# Patient Record
Sex: MALE | Race: ASIAN | NOT HISPANIC OR LATINO | URBAN - METROPOLITAN AREA
[De-identification: names, ages, dates, MRNs, and addresses within clinical notes are randomized per-mention and may not be internally consistent; named-entity substitution may affect disease eponyms.]

---

## 2017-11-18 ENCOUNTER — EMERGENCY (EMERGENCY)
Facility: HOSPITAL | Age: 62
LOS: 1 days | Discharge: HOME | End: 2017-11-18
Attending: EMERGENCY MEDICINE | Admitting: EMERGENCY MEDICINE
Payer: COMMERCIAL

## 2017-11-18 VITALS
OXYGEN SATURATION: 99 % | HEART RATE: 62 BPM | RESPIRATION RATE: 18 BRPM | SYSTOLIC BLOOD PRESSURE: 143 MMHG | TEMPERATURE: 98 F | DIASTOLIC BLOOD PRESSURE: 85 MMHG

## 2017-11-18 DIAGNOSIS — R55 SYNCOPE AND COLLAPSE: ICD-10-CM

## 2017-11-18 PROBLEM — Z00.00 ENCOUNTER FOR PREVENTIVE HEALTH EXAMINATION: Status: ACTIVE | Noted: 2017-11-18

## 2017-11-18 LAB
ALBUMIN SERPL ELPH-MCNC: 4.7 G/DL — SIGNIFICANT CHANGE UP (ref 3.3–5)
ALP SERPL-CCNC: 63 U/L — SIGNIFICANT CHANGE UP (ref 40–120)
ALT FLD-CCNC: 21 U/L — SIGNIFICANT CHANGE UP (ref 4–41)
APTT BLD: 31.1 SEC — SIGNIFICANT CHANGE UP (ref 27.5–37.4)
AST SERPL-CCNC: 26 U/L — SIGNIFICANT CHANGE UP (ref 4–40)
BASOPHILS # BLD AUTO: 0.05 K/UL — SIGNIFICANT CHANGE UP (ref 0–0.2)
BASOPHILS NFR BLD AUTO: 0.6 % — SIGNIFICANT CHANGE UP (ref 0–2)
BILIRUB SERPL-MCNC: 1.6 MG/DL — HIGH (ref 0.2–1.2)
BUN SERPL-MCNC: 13 MG/DL — SIGNIFICANT CHANGE UP (ref 7–23)
CALCIUM SERPL-MCNC: 9.1 MG/DL — SIGNIFICANT CHANGE UP (ref 8.4–10.5)
CHLORIDE SERPL-SCNC: 102 MMOL/L — SIGNIFICANT CHANGE UP (ref 98–107)
CK MB BLD-MCNC: 1.4 — SIGNIFICANT CHANGE UP (ref 0–2.5)
CK MB BLD-MCNC: 1.6 — SIGNIFICANT CHANGE UP (ref 0–2.5)
CK MB BLD-MCNC: 2.24 NG/ML — SIGNIFICANT CHANGE UP (ref 1–6.6)
CK MB BLD-MCNC: 2.49 NG/ML — SIGNIFICANT CHANGE UP (ref 1–6.6)
CK SERPL-CCNC: 153 U/L — SIGNIFICANT CHANGE UP (ref 30–200)
CK SERPL-CCNC: 155 U/L — SIGNIFICANT CHANGE UP (ref 30–200)
CO2 SERPL-SCNC: 29 MMOL/L — SIGNIFICANT CHANGE UP (ref 22–31)
CREAT SERPL-MCNC: 1.24 MG/DL — SIGNIFICANT CHANGE UP (ref 0.5–1.3)
EOSINOPHIL # BLD AUTO: 0.31 K/UL — SIGNIFICANT CHANGE UP (ref 0–0.5)
EOSINOPHIL NFR BLD AUTO: 3.7 % — SIGNIFICANT CHANGE UP (ref 0–6)
GLUCOSE SERPL-MCNC: 117 MG/DL — HIGH (ref 70–99)
HBA1C BLD-MCNC: 6.1 % — HIGH (ref 4–5.6)
HCT VFR BLD CALC: 44 % — SIGNIFICANT CHANGE UP (ref 39–50)
HGB BLD-MCNC: 14.4 G/DL — SIGNIFICANT CHANGE UP (ref 13–17)
IMM GRANULOCYTES # BLD AUTO: 0.03 # — SIGNIFICANT CHANGE UP
IMM GRANULOCYTES NFR BLD AUTO: 0.4 % — SIGNIFICANT CHANGE UP (ref 0–1.5)
INR BLD: 1.01 — SIGNIFICANT CHANGE UP (ref 0.88–1.17)
LYMPHOCYTES # BLD AUTO: 1.81 K/UL — SIGNIFICANT CHANGE UP (ref 1–3.3)
LYMPHOCYTES # BLD AUTO: 21.4 % — SIGNIFICANT CHANGE UP (ref 13–44)
MCHC RBC-ENTMCNC: 27.6 PG — SIGNIFICANT CHANGE UP (ref 27–34)
MCHC RBC-ENTMCNC: 32.7 % — SIGNIFICANT CHANGE UP (ref 32–36)
MCV RBC AUTO: 84.5 FL — SIGNIFICANT CHANGE UP (ref 80–100)
MONOCYTES # BLD AUTO: 0.45 K/UL — SIGNIFICANT CHANGE UP (ref 0–0.9)
MONOCYTES NFR BLD AUTO: 5.3 % — SIGNIFICANT CHANGE UP (ref 2–14)
NEUTROPHILS # BLD AUTO: 5.81 K/UL — SIGNIFICANT CHANGE UP (ref 1.8–7.4)
NEUTROPHILS NFR BLD AUTO: 68.6 % — SIGNIFICANT CHANGE UP (ref 43–77)
NRBC # FLD: 0 — SIGNIFICANT CHANGE UP
PLATELET # BLD AUTO: 200 K/UL — SIGNIFICANT CHANGE UP (ref 150–400)
PMV BLD: 11 FL — SIGNIFICANT CHANGE UP (ref 7–13)
POTASSIUM SERPL-MCNC: 4 MMOL/L — SIGNIFICANT CHANGE UP (ref 3.5–5.3)
POTASSIUM SERPL-SCNC: 4 MMOL/L — SIGNIFICANT CHANGE UP (ref 3.5–5.3)
PROT SERPL-MCNC: 7.3 G/DL — SIGNIFICANT CHANGE UP (ref 6–8.3)
PROTHROM AB SERPL-ACNC: 11.3 SEC — SIGNIFICANT CHANGE UP (ref 9.8–13.1)
RBC # BLD: 5.21 M/UL — SIGNIFICANT CHANGE UP (ref 4.2–5.8)
RBC # FLD: 12.6 % — SIGNIFICANT CHANGE UP (ref 10.3–14.5)
SODIUM SERPL-SCNC: 143 MMOL/L — SIGNIFICANT CHANGE UP (ref 135–145)
TROPONIN T SERPL-MCNC: < 0.06 NG/ML — SIGNIFICANT CHANGE UP (ref 0–0.06)
TROPONIN T SERPL-MCNC: < 0.06 NG/ML — SIGNIFICANT CHANGE UP (ref 0–0.06)
WBC # BLD: 8.46 K/UL — SIGNIFICANT CHANGE UP (ref 3.8–10.5)
WBC # FLD AUTO: 8.46 K/UL — SIGNIFICANT CHANGE UP (ref 3.8–10.5)

## 2017-11-18 PROCEDURE — 71020: CPT | Mod: 26

## 2017-11-18 PROCEDURE — 99220: CPT

## 2017-11-18 RX ORDER — SIMVASTATIN 20 MG/1
20 TABLET, FILM COATED ORAL AT BEDTIME
Qty: 0 | Refills: 0 | Status: DISCONTINUED | OUTPATIENT
Start: 2017-11-18 | End: 2017-11-22

## 2017-11-18 RX ORDER — FAMOTIDINE 10 MG/ML
20 INJECTION INTRAVENOUS ONCE
Qty: 0 | Refills: 0 | Status: COMPLETED | OUTPATIENT
Start: 2017-11-18 | End: 2017-11-18

## 2017-11-18 RX ORDER — LOSARTAN POTASSIUM 100 MG/1
25 TABLET, FILM COATED ORAL DAILY
Qty: 0 | Refills: 0 | Status: DISCONTINUED | OUTPATIENT
Start: 2017-11-18 | End: 2017-11-22

## 2017-11-18 RX ADMIN — SIMVASTATIN 20 MILLIGRAM(S): 20 TABLET, FILM COATED ORAL at 21:02

## 2017-11-18 RX ADMIN — Medication 30 MILLILITER(S): at 19:11

## 2017-11-18 RX ADMIN — LOSARTAN POTASSIUM 25 MILLIGRAM(S): 100 TABLET, FILM COATED ORAL at 21:02

## 2017-11-18 NOTE — CONSULT NOTE ADULT - ATTENDING COMMENTS
Echo reviewed. Normal LV function. Unlikely to have significant ventricular dysrhythmia in this setting.

## 2017-11-18 NOTE — CONSULT NOTE ADULT - SUBJECTIVE AND OBJECTIVE BOX
Date of Admission: 2017    CHIEF COMPLAINT: fainting    HISTORY OF PRESENT ILLNESS: 62M with HLD and HTN presented to his PMD today for routine check up. EKG performed as part of exam and patient was told it was abnormal and MD stated machine needed to be fixed and then repeated EKG with some improvement and then told patient he might have "fibrillation" at which point patient proceeded to faint. He woke up quickly, fully oriented, denied CP, SOB and states this will happen when he becomes panicked or severely anxious. In ER EKG concerning for torsades VT and cardiology was consulted.      Allergies    No Known Allergies    Intolerances    	    MEDICATIONS:          aluminum hydroxide/magnesium hydroxide/simethicone Suspension 30 milliLiter(s) Oral once          PAST MEDICAL & SURGICAL HISTORY:  HTN (hypertension)  H. pylori infection: 9 years ago  GERD (gastroesophageal reflux disease)  Spondylolysis  HLD (hyperlipidemia)  No significant past surgical history      FAMILY HISTORY:  Family history of cerebrovascular accident (CVA) (Sibling)  Family history of acute myocardial infarction (Father, Sibling)      SOCIAL HISTORY:    Smoker    Alcohol  Drugs      REVIEW OF SYSTEMS:  See HPI. Otherwise, 10 point ROS done and otherwise negative.    PHYSICAL EXAM:  T(C): 36.6 (17 @ 12:30), Max: 36.6 (17 @ 12:30)  HR: 65 (17 @ 12:56) (62 - 65)  BP: 146/69 (17 @ 12:56) (143/85 - 146/69)  RR: 16 (17 @ 12:56) (16 - 18)  SpO2: 99% (17 @ 12:56) (99% - 99%)  Wt(kg): --  I&O's Summary      Appearance: Normal	  HEENT:   Normal oral mucosa, PERRL, EOMI	  Cardiovascular: Normal S1 S2, No JVD, No murmurs, No edema  Respiratory: Lungs clear to auscultation	  Psychiatry: A & O x 3, Mood & affect appropriate  Gastrointestinal:  Soft, Non-tender, + BS	  Skin: No rashes, No ecchymoses, No cyanosis	  Neurologic: Non-focal  Extremities: Normal range of motion, No clubbing, cyanosis or edema  Vascular: Peripheral pulses palpable 2+ bilaterally        LABS:	 	                        14.4   8.46  )-----------( 200      ( 2017 12:54 )             44.0           143  |  102  |  13  ----------------------------<  117<H>  4.0   |  29  |  1.24    Ca    9.1      2017 12:54    TPro  7.3  /  Alb  4.7  /  TBili  1.6<H>  /  DBili  x   /  AST  26  /  ALT  21  /  AlkPhos  63        CARDIAC MARKERS:  Troponin T, Serum: < 0.06 ng/mL (17 @ 12:54)      CKMB: 2.49 ng/mL ( @ 12:54)    CKMB Relative Index: 1.6 ( @ 12:54)          TELEMETRY: NSR/ sinus bradycardia  	    EC & 2: artifact 3. sinus bradycardia 4. NSR with NSSTW changes

## 2017-11-18 NOTE — ED ADULT NURSE NOTE - OBJECTIVE STATEMENT
Gallito RN: Patient received to room 23, Aox4 and ambulatory.  was at PCP for routine tests, was told her ECG was abnormal, and then passed out. Denies CP, SOB, N/V or dizziness. States has some mild abd indigestion. IV 20g from EMS in left ac, labs drawn and sent. VSS, HR = 65 NSR on cardiac monitor. States he feels well now. Appears comfortable, NAD.

## 2017-11-18 NOTE — ED CDU PROVIDER INITIAL DAY NOTE - OBJECTIVE STATEMENT
63yo M went to PMD for annual checkup with no sx. As part of his checkup had EKG which he gets yearly. Pt. was told initial EKG abnl and "machine not working," MD called and tried to fix machine. Pt. was told this improved the reading but that he might have "fibrillation." Prior to this pt. had no sx but after being told that MD was concerned about heart pt. states became anxious and felt "cold all over." Then pt. passed out, witnessed, x 1 min., awoke with no confusion, told BP was low at that time and had low HR. EKG from that time showed HR 37, NSR and sent to ED. Initial VS in ED with HR 60s and no sx. At time of exam HR 80s, no sx.    CDU PA: Agree with above, patient with ?arrythmia in PCP office and syncopal episode.  In ED has been in NSR, evaluated by EP who saw EKGs from PCP office and stated likely artifact. However given syncope, patient sent to CDU for tele monitoring, cardiac enzymes and echo in AM. Currently patient feeling, well, asymptomatic. Had not had any recent cardiac work up.

## 2017-11-18 NOTE — ED ADULT NURSE NOTE - CHIEF COMPLAINT QUOTE
pt at [facility] office for physical, while EKG was being preformed pt had episode of A-fib converting to sinus perez then patient passed out. pt currently at [facility]. copies of EKG from MDs office are in chart.

## 2017-11-18 NOTE — ED PROVIDER NOTE - PROGRESS NOTE DETAILS
Pt. NAD, EKGs from office reviewed, likely artifact with precordial leads showing regular QRS beats within poor baseline and then NSR perez EKG done during syncope. Will monitor in CDU, EP to see.

## 2017-11-18 NOTE — CONSULT NOTE ADULT - PROBLEM SELECTOR RECOMMENDATION 9
D/W Dr. Martinez, cardio fellow likely vasovagal episode in setting of extreme anxiety. EKG review shows artifact no evidence of torsade VT. Would monitor in CDU, check ECHO.

## 2017-11-18 NOTE — ED PROVIDER NOTE - PMH
GERD (gastroesophageal reflux disease)    H. pylori infection  9 years ago  HLD (hyperlipidemia)    HTN (hypertension)    Spondylolysis

## 2017-11-18 NOTE — ED PROVIDER NOTE - OBJECTIVE STATEMENT
61yo M went to PMD for annual checkup with no sx. As part of his checkup had EKG which he gets yearly. Pt. was told initial EKG abnl and "machine not working," MD called and tried to fix machine. Pt. was told this improved the reading but that he might have "fibrillation." Prior to this pt. had no sx but after being told that MD was concerned about heart pt. states became anxious and felt "cold all over." Then pt. passed out, witnessed, x 1 min., awoke with no confusion, told BP was low at that time and had low HR. EKG from that time showed HR 37, NSR and sent to ED. Initial VS in ED with HR 60s and no sx. At time of exam HR 80s, no sx.

## 2017-11-18 NOTE — ED PROVIDER NOTE - MEDICAL DECISION MAKING DETAILS
63yo M syncope, ? vasovagal with low HR and BP after receiving bad news. Initial EKGs reviewed likely artifact, appearing torsades-like with no sx. Will check labs, rpt EKG, CXR, reassess.

## 2017-11-18 NOTE — ED ADULT TRIAGE NOTE - CHIEF COMPLAINT QUOTE
pt at [facility] office for physical, while EKG was being preformed pt had episode of A-fib converting to sinus perez then patient passed out. pt currently at [facility]. copies of EKG from MDs office are in chart.

## 2017-11-18 NOTE — ED CDU PROVIDER INITIAL DAY NOTE - ATTENDING CONTRIBUTION TO CARE
62M p/w sent by PMD for ?abnormal EKG as well as syncope.  Pt was getting EKG as a part of routine PMD visit when EKG tracing became abnormal, PMD was called in, pt then reportedly became anxious, cold all over, then briefly LOC.  EKG during event SR, BP soft.  By the time pt came to ED Sx gone.  USOH prior to event, feels much better now.  VS:  unremarkable except BP soft     GEN - NAD; well appearing; A+O x3   HEAD - NC/AT     ENT - PEERL, EOMI, mucous membranes  moist , no discharge      NECK: Neck supple, non-tender without lymphadenopathy, no masses, no JVD  PULM - CTA b/l,  symmetric breath sounds  COR -  normal heart sounds    ABD - , ND, NT, soft, no guarding, no rebound, no masses    BACK - no CVA tenderness, nontender spine     EXTREMS - no edema, no deformity, warm and well perfused    SKIN - no rash or bruising      NEUROLOGIC - alert, CN 2-12 intact, sensation nl, motor 5/5 RUE/LUE/RLE/LLE.      IMP:  62M p/w ?arrhythmia on baseline rhythmn strip at PMD office, pt then had syncope with prodrome.  Likely vasovagal.  EKG prior to event concerning for VT/torsades, however pt had been feeling well at this point.  Cards eval - VT likely artifact, following EKG unremarkable.  Will closely tele monitor and serial CE, echo given syncope.  Reassess in AM.

## 2017-11-18 NOTE — ED PROVIDER NOTE - FAMILY HISTORY
Father  Still living? Unknown  Family history of acute myocardial infarction, Age at diagnosis: Age Unknown     Sibling  Still living? Unknown  Family history of acute myocardial infarction, Age at diagnosis: Age Unknown  Family history of cerebrovascular accident (CVA), Age at diagnosis: Age Unknown

## 2017-11-19 VITALS
HEART RATE: 67 BPM | OXYGEN SATURATION: 99 % | DIASTOLIC BLOOD PRESSURE: 52 MMHG | TEMPERATURE: 98 F | SYSTOLIC BLOOD PRESSURE: 116 MMHG | RESPIRATION RATE: 18 BRPM

## 2017-11-19 PROCEDURE — 99217: CPT

## 2017-11-19 PROCEDURE — 93306 TTE W/DOPPLER COMPLETE: CPT | Mod: 26

## 2017-11-19 NOTE — ED CDU PROVIDER SUBSEQUENT DAY NOTE - HISTORY
61 yo M with PMH of HTN, HLD, GERD, presents with syncope while getting EKG done in PMD office associated with ?arrythemia. In ED has been in NSR, evaluated by EP who saw EKGs from PCP office and stated likely artifact. However given syncope, patient sent to CDU for tele monitoring, cardiac enzymes and echo in AM. Denies any cardiac workup in the past.

## 2017-11-19 NOTE — ED CDU PROVIDER SUBSEQUENT DAY NOTE - ATTENDING CONTRIBUTION TO CARE
CDU Attending Dr. Montenegro: 63 yo male with PMH GERD in ED after syncopal episode while having EKG done in PMD's office during routine evaluation.  EKG reportedly with ?arrhythmia felt to be artifact by EP.  Placed in CDU for echo in light of possible arrhythmia with syncopal episode.  No CP/SOB, N/V/D or abdominal pain.  On exam pt well appearing, in NAD, heart RRR, lungs CTAB, abd NTND, extremities without swelling, strength 5/5 in all extremities and skin without rash.

## 2017-11-19 NOTE — ED CDU PROVIDER SUBSEQUENT DAY NOTE - MEDICAL DECISION MAKING DETAILS
63 yo M with PMH of HTN, HLD, GERD, presents with syncope while getting EKG at PMD office today.  Sent to CDU for Echo in the morning and observation.

## 2017-11-19 NOTE — ED CDU PROVIDER DISPOSITION NOTE - CLINICAL COURSE
Pt placed in CDU for echo after syncopal episode while having EKG at PMD's office.  Some questions of whether or not there was arrhythmia at time of pt's syncope, but no arrhythmia during CDU stay.  No CP/SOB, N/V/D or abdominal pain.  CE negative x 2.  Echo WNL and pt stable for discharge with outpatient follow up.

## 2017-11-19 NOTE — ED CDU PROVIDER SUBSEQUENT DAY NOTE - PROGRESS NOTE DETAILS
Patient lying comfortably in bed, NAD, c/o heart burn given Maalox with improvement. CE2 negative. Echo in the morning.  Will continue to monitor. 61 yo male with PMH GERD in ED after syncopal episode while having EKG done in PMD's office during routine evaluation.  EKG reportedly with ?arrhythmia felt to be artifact by EP.  Placed in CDU for echo in light of possible arrhythmia with syncopal episode.  At the time of my evaluation pt noting feeling no symptoms, denies that he ever had CP.  No SOB, N/V/D or abdominal pain.  On exam pt well appearing, in NAD, heart RRR, lungs CTAB, abd NTND, extremities without swelling, strength 5/5 in all extremities and skin without rash.  Awaiting echo results.

## 2017-11-19 NOTE — ED CDU PROVIDER DISPOSITION NOTE - ATTENDING CONTRIBUTION TO CARE
CDU Attending Dr. Montenegro Discharge Note: Pt placed in CDU for echo after syncopal episode while having EKG at PMD's office.  Some questions of whether or not there was arrhythmia at time of pt's syncope, but no arrhythmia during CDU stay.  No CP/SOB, N/V/D or abdominal pain.  On exam pt well appearing, in NAD, heart RRR, lungs CTAB, abd NTND, extremities without swelling, strength 5/5 in all extremities and skin without rash.  CE negative x 2.  Echo WNL and pt stable for discharge with outpatient follow up.

## 2018-06-04 ENCOUNTER — EMERGENCY (EMERGENCY)
Facility: HOSPITAL | Age: 63
LOS: 1 days | Discharge: HOME | End: 2018-06-04
Attending: EMERGENCY MEDICINE
Payer: COMMERCIAL

## 2018-06-04 VITALS
HEART RATE: 89 BPM | SYSTOLIC BLOOD PRESSURE: 145 MMHG | RESPIRATION RATE: 16 BRPM | OXYGEN SATURATION: 99 % | DIASTOLIC BLOOD PRESSURE: 77 MMHG | HEIGHT: 68 IN | TEMPERATURE: 98 F | WEIGHT: 145.06 LBS

## 2018-06-04 VITALS — TEMPERATURE: 98 F | SYSTOLIC BLOOD PRESSURE: 135 MMHG | HEART RATE: 76 BPM | DIASTOLIC BLOOD PRESSURE: 81 MMHG

## 2018-06-04 LAB
ALBUMIN SERPL ELPH-MCNC: 4.1 G/DL — SIGNIFICANT CHANGE UP (ref 3.5–5)
ALP SERPL-CCNC: 72 U/L — SIGNIFICANT CHANGE UP (ref 40–120)
ALT FLD-CCNC: 35 U/L DA — SIGNIFICANT CHANGE UP (ref 10–60)
ANION GAP SERPL CALC-SCNC: 7 MMOL/L — SIGNIFICANT CHANGE UP (ref 5–17)
APTT BLD: 32.6 SEC — SIGNIFICANT CHANGE UP (ref 27.5–37.4)
AST SERPL-CCNC: 32 U/L — SIGNIFICANT CHANGE UP (ref 10–40)
BASOPHILS # BLD AUTO: 0.1 K/UL — SIGNIFICANT CHANGE UP (ref 0–0.2)
BASOPHILS NFR BLD AUTO: 1 % — SIGNIFICANT CHANGE UP (ref 0–2)
BILIRUB SERPL-MCNC: 1.4 MG/DL — HIGH (ref 0.2–1.2)
BUN SERPL-MCNC: 12 MG/DL — SIGNIFICANT CHANGE UP (ref 7–18)
CALCIUM SERPL-MCNC: 8.8 MG/DL — SIGNIFICANT CHANGE UP (ref 8.4–10.5)
CHLORIDE SERPL-SCNC: 104 MMOL/L — SIGNIFICANT CHANGE UP (ref 96–108)
CO2 SERPL-SCNC: 30 MMOL/L — SIGNIFICANT CHANGE UP (ref 22–31)
CREAT SERPL-MCNC: 1.12 MG/DL — SIGNIFICANT CHANGE UP (ref 0.5–1.3)
EOSINOPHIL # BLD AUTO: 0.3 K/UL — SIGNIFICANT CHANGE UP (ref 0–0.5)
EOSINOPHIL NFR BLD AUTO: 4.2 % — SIGNIFICANT CHANGE UP (ref 0–6)
GLUCOSE SERPL-MCNC: 108 MG/DL — HIGH (ref 70–99)
HCT VFR BLD CALC: 44.4 % — SIGNIFICANT CHANGE UP (ref 39–50)
HGB BLD-MCNC: 14.3 G/DL — SIGNIFICANT CHANGE UP (ref 13–17)
INR BLD: 0.98 RATIO — SIGNIFICANT CHANGE UP (ref 0.88–1.16)
LYMPHOCYTES # BLD AUTO: 1.4 K/UL — SIGNIFICANT CHANGE UP (ref 1–3.3)
LYMPHOCYTES # BLD AUTO: 16.8 % — SIGNIFICANT CHANGE UP (ref 13–44)
MCHC RBC-ENTMCNC: 27.8 PG — SIGNIFICANT CHANGE UP (ref 27–34)
MCHC RBC-ENTMCNC: 32.2 GM/DL — SIGNIFICANT CHANGE UP (ref 32–36)
MCV RBC AUTO: 86.6 FL — SIGNIFICANT CHANGE UP (ref 80–100)
MONOCYTES # BLD AUTO: 0.4 K/UL — SIGNIFICANT CHANGE UP (ref 0–0.9)
MONOCYTES NFR BLD AUTO: 4.6 % — SIGNIFICANT CHANGE UP (ref 2–14)
NEUTROPHILS # BLD AUTO: 6 K/UL — SIGNIFICANT CHANGE UP (ref 1.8–7.4)
NEUTROPHILS NFR BLD AUTO: 73.3 % — SIGNIFICANT CHANGE UP (ref 43–77)
PLATELET # BLD AUTO: 184 K/UL — SIGNIFICANT CHANGE UP (ref 150–400)
POTASSIUM SERPL-MCNC: 3.7 MMOL/L — SIGNIFICANT CHANGE UP (ref 3.5–5.3)
POTASSIUM SERPL-SCNC: 3.7 MMOL/L — SIGNIFICANT CHANGE UP (ref 3.5–5.3)
PROT SERPL-MCNC: 7.7 G/DL — SIGNIFICANT CHANGE UP (ref 6–8.3)
PROTHROM AB SERPL-ACNC: 10.7 SEC — SIGNIFICANT CHANGE UP (ref 9.8–12.7)
RBC # BLD: 5.13 M/UL — SIGNIFICANT CHANGE UP (ref 4.2–5.8)
RBC # FLD: 12.4 % — SIGNIFICANT CHANGE UP (ref 10.3–14.5)
SODIUM SERPL-SCNC: 141 MMOL/L — SIGNIFICANT CHANGE UP (ref 135–145)
TROPONIN I SERPL-MCNC: <0.015 NG/ML — SIGNIFICANT CHANGE UP (ref 0–0.04)
WBC # BLD: 8.2 K/UL — SIGNIFICANT CHANGE UP (ref 3.8–10.5)
WBC # FLD AUTO: 8.2 K/UL — SIGNIFICANT CHANGE UP (ref 3.8–10.5)

## 2018-06-04 PROCEDURE — 70450 CT HEAD/BRAIN W/O DYE: CPT | Mod: 26

## 2018-06-04 PROCEDURE — 70450 CT HEAD/BRAIN W/O DYE: CPT

## 2018-06-04 PROCEDURE — 85730 THROMBOPLASTIN TIME PARTIAL: CPT

## 2018-06-04 PROCEDURE — 80053 COMPREHEN METABOLIC PANEL: CPT

## 2018-06-04 PROCEDURE — 85027 COMPLETE CBC AUTOMATED: CPT

## 2018-06-04 PROCEDURE — 84484 ASSAY OF TROPONIN QUANT: CPT

## 2018-06-04 PROCEDURE — 85610 PROTHROMBIN TIME: CPT

## 2018-06-04 PROCEDURE — 99284 EMERGENCY DEPT VISIT MOD MDM: CPT

## 2018-06-04 PROCEDURE — 99284 EMERGENCY DEPT VISIT MOD MDM: CPT | Mod: 25

## 2018-06-04 RX ORDER — SODIUM CHLORIDE 9 MG/ML
1000 INJECTION INTRAMUSCULAR; INTRAVENOUS; SUBCUTANEOUS ONCE
Qty: 0 | Refills: 0 | Status: COMPLETED | OUTPATIENT
Start: 2018-06-04 | End: 2018-06-04

## 2018-06-04 RX ADMIN — SODIUM CHLORIDE 1000 MILLILITER(S): 9 INJECTION INTRAMUSCULAR; INTRAVENOUS; SUBCUTANEOUS at 16:32

## 2018-06-04 NOTE — ED PROVIDER NOTE - PROGRESS NOTE DETAILS
Nurse went to draw blood and pt screamed and is demanding to leave. He says he went to see a nephrologist and was sent to the ED and does not want to be here. AAOx3, risks, benefits, alternatives and hazards reviewed and he will sign ama and follow up with pmd. Precautions reviewed. Pt feels fine observed, tolerating po, no complaints, DW Dr gomez from neuro and will see in office. Pt is not driving. Precautions reviewed vasovagal syncope vs seizure.

## 2018-06-04 NOTE — ED ADULT NURSE NOTE - OBJECTIVE STATEMENT
Pt states that he was at the pmd office and spouse reported pt having "seizures like" gestures, pt was seen being shaking, diaphoretic and Loc for 1-2 mins.Pt denies having seizures. Last time pt has similar symptoms was 4 months ago.

## 2018-06-04 NOTE — ED PROVIDER NOTE - CRANIAL NERVE AND PUPILLARY EXAM
central vision intact/peripheral vision intact/tongue is midline/cranial nerves 2-12 intact/corneal reflex intact/central and peripheral vision intact/extra-ocular movements intact/cough reflex intact

## 2018-06-04 NOTE — ED PROVIDER NOTE - OBJECTIVE STATEMENT
61 y/o male with no significant PMHx presents to the ED c/o syncope x today. Pt notes he was admitted to [facility] x 4 months for observation s/p an episode of syncope, pt was noted to have an abnormal EKG. Pt went to she his eye doctor today, was told he has the beginning of glaucoma. Pt then had a syncopal episode while sitting at his eye doctor's office, witnessed by wife. Pt's R sided body was shaking, pt urinated himself, lasted 2 minutes and repeated itself once again. Pt now in ED without complaints. Pt denies HA, neck pain, abd pain, chest pain, palpitations, or any other complaints. NKDA.

## 2018-06-04 NOTE — ED PROVIDER NOTE - MEDICAL DECISION MAKING DETAILS
Pt with syncopal episodes associated with urinary incontinence and R sided body shaking x today. Will get CT head, send labs and consult neuro.

## 2018-10-16 ENCOUNTER — TRANSCRIPTION ENCOUNTER (OUTPATIENT)
Age: 63
End: 2018-10-16

## 2018-12-11 NOTE — ED ADULT NURSE NOTE - NS ED NURSE IV DC DT
Detail Level: Generalized Include Location In Plan?: No Additional Note: Advised that lesions are benign in nature and require no treatment if asymptomatic. Additional Note: Educated lesions are benign in nature and require no treatment. 04-Jun-2018 19:41

## 2021-09-13 PROBLEM — I10 ESSENTIAL (PRIMARY) HYPERTENSION: Chronic | Status: ACTIVE | Noted: 2017-11-18

## 2021-10-08 ENCOUNTER — APPOINTMENT (OUTPATIENT)
Dept: UROLOGY | Facility: CLINIC | Age: 66
End: 2021-10-08
Payer: COMMERCIAL

## 2021-10-08 VITALS
WEIGHT: 148 LBS | SYSTOLIC BLOOD PRESSURE: 155 MMHG | HEART RATE: 75 BPM | HEIGHT: 67 IN | RESPIRATION RATE: 16 BRPM | BODY MASS INDEX: 23.23 KG/M2 | OXYGEN SATURATION: 97 % | DIASTOLIC BLOOD PRESSURE: 82 MMHG

## 2021-10-08 PROCEDURE — 99204 OFFICE O/P NEW MOD 45 MIN: CPT

## 2021-10-10 NOTE — HISTORY OF PRESENT ILLNESS
[FreeTextEntry1] : 10/08/2021: Mr. Mc is a 66 year old male presenting today for a urological assessment. He is currently experiencing urinary urgency, specifically when he gets up after driving for an extended period of time. He noticed it for the first time about 4-5 months ago. Sometimes he feels moistness in his groin. His family doctor prescribed Finasteride 5 mg, one tablet daily. Denies urge incontinence. There is also terminal dribbling when he finishes urinating. A few years ago after he began taking blood pressure medication, he noticed his penis is bent and he lost about 80% erection function. In the past 2-3 months, his erections have improved and he is happy with the status. The erections are now 75% adequate. He reports feeling a fibrous plaque on the base of the penis. One year ago before beginning Finasteride, his PSA was 3.9 ng/mL. After 2 months of the Finasteride, his PSA was about 0.9 ng/mL and his creatinine was within normal limits. He was also treated at the time with Ciprofloxacin for prostatitis. The family doctor did a rectal exam and reported that the prostate was smooth, normal texture, but was enlarged. The patient has lower left flank pain and discomfort in his left groin. He is managing heartburn and constipation. He has had cataract surgery on both eyes. He takes Losartan potassium 25 mg and Simvastatin. No FHx of prostate cancer or kidney stones. No PMHx of kidney stones. No history of seizures. He has been evaluated and diagnosed with vasovagal syncope.

## 2021-10-10 NOTE — PHYSICAL EXAM
[General Appearance - Well Developed] : well developed [General Appearance - Well Nourished] : well nourished [Normal Appearance] : normal appearance [Well Groomed] : well groomed [General Appearance - In No Acute Distress] : no acute distress [Edema] : no peripheral edema [Respiration, Rhythm And Depth] : normal respiratory rhythm and effort [Exaggerated Use Of Accessory Muscles For Inspiration] : no accessory muscle use [Abdomen Soft] : soft [Abdomen Tenderness] : non-tender [Normal Station and Gait] : the gait and station were normal for the patient's age [] : no rash [No Focal Deficits] : no focal deficits [Oriented To Time, Place, And Person] : oriented to person, place, and time [Affect] : the affect was normal [Mood] : the mood was normal [Not Anxious] : not anxious [Prostate Size ___ gm] : prostate size [unfilled] gm [FreeTextEntry1] : On exam, patient is circumcised. He says that the proximal right corpa cavernosa used to have hard fibrous plaque in it, but it is underneath mons pubis, there is some firmness. Bulk of penile shaft is normal. Urethral meatus is normal. Urethra supple, nontender. No inguinal adenopathy. Bilateral descended and soft testes. Testes no masses. Epididymides nontender no masses. Spermatic cord is nontender with no masses.  Digital rectal exam found no suspicious rectal masses. Anal tone is normal. The prostate is non tender, with normal texture, discrete borders, and no nodules. It is an 10g transurethral resection size. No rectal mucosal lesions. No gross blood on exam finger.

## 2021-10-10 NOTE — ASSESSMENT
[FreeTextEntry1] : 10/08/2021: Mr. Mc is a 66 year old male presenting today for a urological assessment. He is currently experiencing urinary urgency, specifically when he gets up after driving for an extended period of time. He noticed it for the first time about 4-5 months ago. Sometimes he feels moistness in his groin. His family doctor prescribed Finasteride 5 mg, one tablet daily. Denies urge incontinence. There is also terminal dribbling when he finishes urinating. A few years ago after he began taking blood pressure medication, he noticed his penis is bent and he lost about 80% erection function. In the past 2-3 months, his erections have improved and he is happy with the status. The erections are now 75% adequate. He reports feeling a fibrous plaque on the base of the penis. One year ago before beginning Finasteride, his PSA was 3.9 ng/mL. After 2 months of the Finasteride, his PSA was about 0.9 ng/mL and his creatinine was within normal limits. He was also treated at the time with Ciprofloxacin for prostatitis. The family doctor did a rectal exam and reported that the prostate was smooth, normal texture, but was enlarged. The patient has lower left flank pain and discomfort in his left groin. He is managing heartburn and constipation. He has had cataract surgery on both eyes. He takes Losartan potassium 25 mg and Simvastatin. No FHx of prostate cancer or kidney stones. No PMHx of kidney stones. No history of seizures. He has been evaluated and diagnosed with vasovagal syncope. \par \par On exam, patient is circumcised. He says that the proximal right corpa cavernosa used to have hard fibrous plaque in it, but it is underneath mons pubis, there is some firmness. Bulk of penile shaft is normal. Urethral meatus is normal. Urethra supple, nontender. No inguinal adenopathy. Bilateral descended and soft testes. Testes no masses. Epididymides nontender no masses. Spermatic cord is nontender with no masses. \par Digital rectal exam found no suspicious rectal masses. Anal tone is normal. The prostate is non tender, with normal texture, discrete borders, and no nodules. It is an 10g transurethral resection size. No rectal mucosal lesions. No gross blood on exam finger. \par \par The patient has a prescription for Flomax 0.4 mg. I recommend he takes one capsule daily with food. I advised him of the potential side effects of retrograde ejaculation or lightheadedness. \par \par RTO in 2-3 weeks. \par \par Preparation, in-person office visit, and coordination of care took: 45 minutes.

## 2021-10-29 ENCOUNTER — APPOINTMENT (OUTPATIENT)
Dept: UROLOGY | Facility: CLINIC | Age: 66
End: 2021-10-29
Payer: COMMERCIAL

## 2021-10-29 PROCEDURE — 99214 OFFICE O/P EST MOD 30 MIN: CPT

## 2021-10-30 LAB
ANION GAP SERPL CALC-SCNC: 13 MMOL/L
APPEARANCE: CLEAR
BACTERIA: NEGATIVE
BILIRUBIN URINE: NEGATIVE
BLOOD URINE: NEGATIVE
BUN SERPL-MCNC: 13 MG/DL
CA-I SERPL-SCNC: 1.2 MMOL/L
CALCIUM SERPL-MCNC: 9.3 MG/DL
CALCIUM SERPL-MCNC: 9.3 MG/DL
CHLORIDE SERPL-SCNC: 103 MMOL/L
CO2 SERPL-SCNC: 26 MMOL/L
COLOR: NORMAL
CREAT SERPL-MCNC: 1.2 MG/DL
GLUCOSE QUALITATIVE U: NEGATIVE
GLUCOSE SERPL-MCNC: 174 MG/DL
HYALINE CASTS: 0 /LPF
KETONES URINE: NEGATIVE
LEUKOCYTE ESTERASE URINE: NEGATIVE
MICROSCOPIC-UA: NORMAL
NITRITE URINE: NEGATIVE
PARATHYROID HORMONE INTACT: 50 PG/ML
PH URINE: 5.5
PHOSPHATE SERPL-MCNC: 2.2 MG/DL
POTASSIUM SERPL-SCNC: 3.9 MMOL/L
PROTEIN URINE: NEGATIVE
PSA FREE FLD-MCNC: 32 %
PSA FREE SERPL-MCNC: 0.34 NG/ML
PSA SERPL-MCNC: 1.06 NG/ML
RED BLOOD CELLS URINE: 0 /HPF
SODIUM SERPL-SCNC: 142 MMOL/L
SPECIFIC GRAVITY URINE: 1.01
SQUAMOUS EPITHELIAL CELLS: 0 /HPF
UROBILINOGEN URINE: NORMAL
WHITE BLOOD CELLS URINE: 0 /HPF

## 2021-10-31 LAB — BACTERIA UR CULT: NORMAL

## 2021-10-31 NOTE — ASSESSMENT
[FreeTextEntry1] : 10/08/2021: Mr. Mc is a 66 year old male presenting today for a urological assessment. He is currently experiencing urinary urgency, specifically when he gets up after driving for an extended period of time. He noticed it for the first time about 4-5 months ago. Sometimes he feels moistness in his groin. His family doctor prescribed Finasteride 5 mg, one tablet daily. Denies urge incontinence. There is also terminal dribbling when he finishes urinating. A few years ago after he began taking blood pressure medication, he noticed his penis is bent and he lost about 80% erection function. In the past 2-3 months, his erections have improved and he is happy with the status. The erections are now 75% adequate. He reports feeling a fibrous plaque on the base of the penis. One year ago before beginning Finasteride, his PSA was 3.9 ng/mL. After 2 months of the Finasteride, his PSA was about 0.9 ng/mL and his creatinine was within normal limits. He was also treated at the time with Ciprofloxacin for prostatitis. The family doctor did a rectal exam and reported that the prostate was smooth, normal texture, but was enlarged. The patient has lower left flank pain and discomfort in his left groin. He is managing heartburn and constipation. He has had cataract surgery on both eyes. He takes Losartan potassium 25 mg and Simvastatin. No FHx of prostate cancer or kidney stones. No PMHx of kidney stones. No history of seizures. He has been evaluated and diagnosed with vasovagal syncope. \par On exam, patient is circumcised. He says that the proximal right corpa cavernosa used to have hard fibrous plaque in it, but it is underneath mons pubis, there is some firmness. Bulk of penile shaft is normal. Urethral meatus is normal. Urethra supple, nontender. No inguinal adenopathy. Bilateral descended and soft testes. Testes no masses. Epididymides nontender no masses. Spermatic cord is nontender with no masses. \par Digital rectal exam found no suspicious rectal masses. Anal tone is normal. The prostate is non tender, with normal texture, discrete borders, and no nodules. It is an 10g transurethral resection size. No rectal mucosal lesions. No gross blood on exam finger. \par \par 10/29/2021: Patient presents today for follow up. He has continued urinary urgency that has improved about 10% with the Tamsulosin 0.4 mg. He has had no lightheadedness, but notes that his blood pressure dropped to 90/60. Since then, he has stopped taking his Losartan and his blood pressure returned to normal. His PSA recently was 0.9 ng/mL, which he verbalized. His blood work from 10/9/21 showed a TSH was normal and his creatinine was 1.01 and alkaline phos was normal at 73. \par \par Blood work today included BMP, CBC, alkaline phosphatase, PSA, and testosterone. \par \par I advised the patient to discontinue taking the Tamsulosin 0.4 mg. \par I am prescribing Silodosin 8 mg, one capsule daily with dinner. I recommend he continue monitoring his blood pressure. If his pressure rises again, he should begin taking his Losartan again. If he has problems getting the medication through his insurance, he should call my nurse for pre-certification. \par \par The patient will RTO in 2 weeks. \par \par Preparation, in-person office visit, and coordination of care took: 30 minutes

## 2021-10-31 NOTE — HISTORY OF PRESENT ILLNESS
[FreeTextEntry1] : 10/08/2021: Mr. Mc is a 66 year old male presenting today for a urological assessment. He is currently experiencing urinary urgency, specifically when he gets up after driving for an extended period of time. He noticed it for the first time about 4-5 months ago. Sometimes he feels moistness in his groin. His family doctor prescribed Finasteride 5 mg, one tablet daily. Denies urge incontinence. There is also terminal dribbling when he finishes urinating. A few years ago after he began taking blood pressure medication, he noticed his penis is bent and he lost about 80% erection function. In the past 2-3 months, his erections have improved and he is happy with the status. The erections are now 75% adequate. He reports feeling a fibrous plaque on the base of the penis. One year ago before beginning Finasteride, his PSA was 3.9 ng/mL. After 2 months of the Finasteride, his PSA was about 0.9 ng/mL and his creatinine was within normal limits. He was also treated at the time with Ciprofloxacin for prostatitis. The family doctor did a rectal exam and reported that the prostate was smooth, normal texture, but was enlarged. The patient has lower left flank pain and discomfort in his left groin. He is managing heartburn and constipation. He has had cataract surgery on both eyes. He takes Losartan potassium 25 mg and Simvastatin. No FHx of prostate cancer or kidney stones. No PMHx of kidney stones. No history of seizures. He has been evaluated and diagnosed with vasovagal syncope.  \par \par 10/29/2021: Patient presents today for follow up. He has continued urinary urgency that has improved about 10% with the Tamsulosin 0.4 mg. He has had no lightheadedness, but notes that his blood pressure dropped to 90/60. Since then, he has stopped taking his Losartan and his blood pressure returned to normal. His PSA recently was 0.9 ng/mL, which he verbalized. His blood work from 10/9/21 showed a TSH was normal and his creatinine was 1.01 and alkaline phos was normal at 73.

## 2021-11-01 ENCOUNTER — NON-APPOINTMENT (OUTPATIENT)
Age: 66
End: 2021-11-01

## 2021-11-02 LAB
TESTOST BND SERPL-MCNC: 6.9 PG/ML
TESTOSTERONE TOTAL S: 214 NG/DL
URINE CYTOLOGY: NORMAL

## 2021-11-15 ENCOUNTER — APPOINTMENT (OUTPATIENT)
Dept: UROLOGY | Facility: CLINIC | Age: 66
End: 2021-11-15

## 2021-12-30 NOTE — ED ADULT NURSE NOTE - NSSISCREENINGQ5_ED_A_ED
Current Issues/Problems reviewed on call:  DM  HTN  Surgery status  Covid safety    Details for Interventions/Education completed on call:   Reports is feeling good today with minimal discomfort to hip. States is now aware as to why his surgery was postponed and is looking forward to having procedure completed. Recommended attend specialist appointment and confirmed appointment date and time.    Reports is continuing to exercise several times a week and states would like to visit gym because of current weather conditions. Discussed Covid safety protocols- masking, social distancing, handwashing. Verbalized understanding and reports has ad all vaccines including booster. Advised to practice safety precautions if visiting health club and recommended continuing to walk outside if weather permits or performing exercises within in the home as tolerated. Verbalized understanding.    Screening completed for  COVID -19 using the [facility] Symptom . Screening is Negative for symptoms    Time Frame for Follow up:  Within within 2-3 weeks    Plan for Next Call: Monthly fu, provider fu, BS, BP    Care Plan reviewed with Patient  and he agrees with the plan of care and the call follow up plan.         No

## 2022-01-19 ENCOUNTER — APPOINTMENT (OUTPATIENT)
Dept: UROLOGY | Facility: CLINIC | Age: 67
End: 2022-01-19
Payer: COMMERCIAL

## 2022-01-19 PROCEDURE — 99214 OFFICE O/P EST MOD 30 MIN: CPT

## 2022-01-21 NOTE — ED CDU PROVIDER INITIAL DAY NOTE - NSTIMEPROVIDERCAREINITIATE_GEN_ER
You can access the FollowMyHealth Patient Portal offered by [facility] [place] by registering at the following website: [email or web address]. By joining [employer]’s FollowMyHealth portal, you will also be able to view your health information using other applications (apps) compatible with our system.
~1 month ago; normal as per pt
18-Nov-2017 12:47

## 2022-01-22 LAB
ALBUMIN SERPL ELPH-MCNC: 4.5 G/DL
ANION GAP SERPL CALC-SCNC: 15 MMOL/L
APPEARANCE: CLEAR
BACTERIA UR CULT: NORMAL
BACTERIA: NEGATIVE
BILIRUBIN URINE: NEGATIVE
BLOOD URINE: NEGATIVE
CA-I SERPL-SCNC: 1.2 MMOL/L
CALCIUM SERPL-MCNC: 9.1 MG/DL
CHLORIDE SERPL-SCNC: 105 MMOL/L
CO2 SERPL-SCNC: 24 MMOL/L
COLOR: COLORLESS
DHEA-S SERPL-MCNC: 250 UG/DL
ESTIMATED AVERAGE GLUCOSE: 128 MG/DL
ESTRADIOL SERPL-MCNC: 23 PG/ML
ESTROGEN SERPL-MCNC: 129 PG/ML
FSH SERPL-MCNC: 3.3 IU/L
GLUCOSE QUALITATIVE U: NEGATIVE
GLUCOSE SERPL-MCNC: 122 MG/DL
HBA1C MFR BLD HPLC: 6.1 %
HYALINE CASTS: 0 /LPF
KETONES URINE: NEGATIVE
LEUKOCYTE ESTERASE URINE: NEGATIVE
LH SERPL-ACNC: 4.2 IU/L
MICROSCOPIC-UA: NORMAL
NITRITE URINE: NEGATIVE
PH URINE: 7
PHOSPHATE SERPL-MCNC: 3 MG/DL
POTASSIUM SERPL-SCNC: 4.1 MMOL/L
PROGEST SERPL-MCNC: 0.2 NG/ML
PROLACTIN SERPL-MCNC: 8.9 NG/ML
PROT SERPL-MCNC: 6.9 G/DL
PROTEIN URINE: NEGATIVE
RED BLOOD CELLS URINE: 0 /HPF
SHBG SERPL-SCNC: 23.5 NMOL/L
SODIUM SERPL-SCNC: 144 MMOL/L
SPECIFIC GRAVITY URINE: 1.01
SQUAMOUS EPITHELIAL CELLS: 0 /HPF
TESTOST FREE SERPL-MCNC: 8.4 PG/ML
TESTOST SERPL-MCNC: 262 NG/DL
URINE CYTOLOGY: NORMAL
UROBILINOGEN URINE: NORMAL
WHITE BLOOD CELLS URINE: 0 /HPF

## 2022-01-22 NOTE — ADDENDUM
[FreeTextEntry1] : I, Noris Ospinain, acted solely as a scribe for Dr. Chente Harper on this date 01/19/2022.\par \par All medical record entries made by the Scribe were at my, Dr. Chente Harper, direction and personally dictated by me on 01/19/2022. I have reviewed the chart and agree that the record accurately reflects my personal performance of the history, physical exam, assessment and plan.  I have also personally directed, reviewed and agreed with the chart.

## 2022-01-22 NOTE — HISTORY OF PRESENT ILLNESS
[FreeTextEntry1] : 10/08/2021: Mr. Mc is a 66 year old male presenting today for a urological assessment. He is currently experiencing urinary urgency, specifically when he gets up after driving for an extended period of time. He noticed it for the first time about 4-5 months ago. Sometimes he feels moistness in his groin. His family doctor prescribed Finasteride 5 mg, one tablet daily. Denies urge incontinence. There is also terminal dribbling when he finishes urinating. A few years ago after he began taking blood pressure medication, he noticed his penis is bent and he lost about 80% erection function. In the past 2-3 months, his erections have improved and he is happy with the status. The erections are now 75% adequate. He reports feeling a fibrous plaque on the base of the penis. One year ago before beginning Finasteride, his PSA was 3.9 ng/mL. After 2 months of the Finasteride, his PSA was about 0.9 ng/mL and his creatinine was within normal limits. He was also treated at the time with Ciprofloxacin for prostatitis. The family doctor did a rectal exam and reported that the prostate was smooth, normal texture, but was enlarged. The patient has lower left flank pain and discomfort in his left groin. He is managing heartburn and constipation. He has had cataract surgery on both eyes. He takes Losartan potassium 25 mg and Simvastatin. No FHx of prostate cancer or kidney stones. No PMHx of kidney stones. No history of seizures. He has been evaluated and diagnosed with vasovagal syncope.  \par \par 10/29/2021: Patient presents today for follow up. He has continued urinary urgency that has improved about 10% with the Tamsulosin 0.4 mg. He has had no lightheadedness, but notes that his blood pressure dropped to 90/60. Since then, he has stopped taking his Losartan and his blood pressure returned to normal. His PSA recently was 0.9 ng/mL, which he verbalized. His blood work from 10/9/21 showed a TSH was normal and his creatinine was 1.01 and alkaline phos was normal at 73. \par \par 01/19/2022: Patient presents today for follow up. Currently on silodosin 8mg once daily and urinary symptoms are significantly improved. Urinary urgency is 90% improved. Leaks very few drops when he has urgency. States he has intermittent constipation. States that he stopped his Losartan due to BP being too low, and now BP is normal and he feels okay. Did notify his PCP of this. Erections are better than before and now satisfactory. Notes he does not have ejaculate which he is not concerned about. No hx of kidney stones. Additionally states he has some left lumbar pain that does not change with position but states he feels it in his muscle.

## 2022-01-22 NOTE — ASSESSMENT
[FreeTextEntry1] : 10/08/2021: Mr. Mc is a 66 year old male presenting today for a urological assessment. He is currently experiencing urinary urgency, specifically when he gets up after driving for an extended period of time. He noticed it for the first time about 4-5 months ago. Sometimes he feels moistness in his groin. His family doctor prescribed Finasteride 5 mg, one tablet daily. Denies urge incontinence. There is also terminal dribbling when he finishes urinating. A few years ago after he began taking blood pressure medication, he noticed his penis is bent and he lost about 80% erection function. In the past 2-3 months, his erections have improved and he is happy with the status. The erections are now 75% adequate. He reports feeling a fibrous plaque on the base of the penis. One year ago before beginning Finasteride, his PSA was 3.9 ng/mL. After 2 months of the Finasteride, his PSA was about 0.9 ng/mL and his creatinine was within normal limits. He was also treated at the time with Ciprofloxacin for prostatitis. The family doctor did a rectal exam and reported that the prostate was smooth, normal texture, but was enlarged. The patient has lower left flank pain and discomfort in his left groin. He is managing heartburn and constipation. He has had cataract surgery on both eyes. He takes Losartan potassium 25 mg and Simvastatin. No FHx of prostate cancer or kidney stones. No PMHx of kidney stones. No history of seizures. He has been evaluated and diagnosed with vasovagal syncope. \par \par 10/29/2021: Patient presents today for follow up. He has continued urinary urgency that has improved about 10% with the Tamsulosin 0.4 mg. He has had no lightheadedness, but notes that his blood pressure dropped to 90/60. Since then, he has stopped taking his Losartan and his blood pressure returned to normal. His PSA recently was 0.9 ng/mL, which he verbalized. His blood work from 10/9/21 showed a TSH was normal and his creatinine was 1.01 and alkaline phos was normal at 73. \par \par 01/19/2022: Patient presents today for follow up. Currently on silodosin 8mg once daily and urinary symptoms are significantly improved. Urinary urgency is 90% improved. Leaks very few drops when he has urgency. States he has intermittent constipation. States that he stopped his Losartan due to BP being too low, and now BP is normal and he feels okay. Did notify his PCP of this. Erections are better than before and now satisfactory. Notes he does not have ejaculate which he is not concerned about. No hx of kidney stones. Additionally states he has some left lumbar pain that does not change with position but states he feels it in his muscle. \par \par Reviewed 10/29/21 lab results with patient. \par \par I prescribed the patient oxybutynin ER 5mg once daily for urinary urgency. I informed the patient of dry mouth and constipation as a side effect. \par \par Continue silodosin 8mg once daily. \par \par I explained that lack of ejaculate is due to silodosin and is not concerning. \par \par Recommend Colace (docusate) two capsules twice daily to be taken with a large glass of water each time for constipation. \par \par Recommend heat, exercises, and CBD oil for left lumbar pain. I discussed a renal US to evaluate the pain but if patient believes it is muscular and more superficial pain, then it is not necessary. \par \par Blood work today includes albumin, calcium ionized, calcium total, electrolyte panel, phosphorus, protein, HbA1c, glucose RC client gray top, androstenedione, dehydroepiandrosterone, dehydroepiandrosterone-sulfate, dihydrotestosterone, estradiol, estrogen, FSH, LH, progesterone, prolactin, SHBG, PSA, and testosterone.  \par \par The patient produced a urine sample which will be sent for urinalysis, urine cytology, and urine culture. \par \par Patient will schedule a telehealth video visit in 2 weeks for reassessment of oxybutynin ER 5mg. \par \par Preparation, in-person office visit, and coordination of care took: 30 minutes

## 2022-01-25 LAB — ANDROST SERPL-MCNC: 50 NG/DL

## 2022-01-29 LAB
ANDROSTANOLONE SERPL-MCNC: 34 NG/DL
DHEA SERPL-MCNC: 150 NG/DL

## 2022-03-31 ENCOUNTER — APPOINTMENT (OUTPATIENT)
Dept: UROLOGY | Facility: CLINIC | Age: 67
End: 2022-03-31
Payer: COMMERCIAL

## 2022-03-31 VITALS
TEMPERATURE: 97.8 F | WEIGHT: 150 LBS | OXYGEN SATURATION: 99 % | SYSTOLIC BLOOD PRESSURE: 137 MMHG | HEIGHT: 67 IN | HEART RATE: 72 BPM | BODY MASS INDEX: 23.54 KG/M2 | DIASTOLIC BLOOD PRESSURE: 77 MMHG

## 2022-03-31 DIAGNOSIS — K21.00 GASTRO-ESOPHAGEAL REFLUX DISEASE WITH ESOPHAGITIS, WITHOUT BLEEDING: ICD-10-CM

## 2022-03-31 PROCEDURE — 99214 OFFICE O/P EST MOD 30 MIN: CPT

## 2022-04-03 PROBLEM — K21.00 GASTROESOPHAGEAL REFLUX DISEASE WITH ESOPHAGITIS: Status: ACTIVE | Noted: 2022-04-03

## 2022-04-03 LAB
APPEARANCE: CLEAR
BACTERIA UR CULT: NORMAL
BACTERIA: NEGATIVE
BILIRUBIN URINE: NEGATIVE
BLOOD URINE: NEGATIVE
COLOR: NORMAL
GLUCOSE QUALITATIVE U: NEGATIVE
HYALINE CASTS: 0 /LPF
KETONES URINE: NEGATIVE
LEUKOCYTE ESTERASE URINE: NEGATIVE
MICROSCOPIC-UA: NORMAL
NITRITE URINE: NEGATIVE
PH URINE: 7
PROTEIN URINE: NEGATIVE
PSA FREE FLD-MCNC: 33 %
PSA FREE SERPL-MCNC: 0.34 NG/ML
PSA SERPL-MCNC: 1.03 NG/ML
RED BLOOD CELLS URINE: 0 /HPF
SPECIFIC GRAVITY URINE: 1.01
SQUAMOUS EPITHELIAL CELLS: 0 /HPF
URINE CYTOLOGY: NORMAL
UROBILINOGEN URINE: NORMAL
WHITE BLOOD CELLS URINE: 0 /HPF

## 2022-04-03 NOTE — ASSESSMENT
[FreeTextEntry1] : 10/08/2021: Mr. Mc is a 66 year old male presenting today for a urological assessment. He is currently experiencing urinary urgency, specifically when he gets up after driving for an extended period of time. He noticed it for the first time about 4-5 months ago. Sometimes he feels moistness in his groin. His family doctor prescribed Finasteride 5 mg, one tablet daily. Denies urge incontinence. There is also terminal dribbling when he finishes urinating. A few years ago after he began taking blood pressure medication, he noticed his penis is bent and he lost about 80% erection function. In the past 2-3 months, his erections have improved and he is happy with the status. The erections are now 75% adequate. He reports feeling a fibrous plaque on the base of the penis. One year ago before beginning Finasteride, his PSA was 3.9 ng/mL. After 2 months of the Finasteride, his PSA was about 0.9 ng/mL and his creatinine was within normal limits. He was also treated at the time with Ciprofloxacin for prostatitis. The family doctor did a rectal exam and reported that the prostate was smooth, normal texture, but was enlarged. The patient has lower left flank pain and discomfort in his left groin. He is managing heartburn and constipation. He has had cataract surgery on both eyes. He takes Losartan potassium 25 mg and Simvastatin. No FHx of prostate cancer or kidney stones. No PMHx of kidney stones. No history of seizures. He has been evaluated and diagnosed with vasovagal syncope. \par \par 10/29/2021: Patient presents today for follow up. He has continued urinary urgency that has improved about 10% with the Tamsulosin 0.4 mg. He has had no lightheadedness, but notes that his blood pressure dropped to 90/60. Since then, he has stopped taking his Losartan and his blood pressure returned to normal. His PSA recently was 0.9 ng/mL, which he verbalized. His blood work from 10/9/21 showed a TSH was normal and his creatinine was 1.01 and alkaline phos was normal at 73. \par \par 01/19/2022: Patient presents today for follow up. Currently on silodosin 8mg once daily and urinary symptoms are significantly improved. Urinary urgency is 90% improved. Leaks very few drops when he has urgency. States he has intermittent constipation. States that he stopped his Losartan due to BP being too low, and now BP is normal and he feels okay. Did notify his PCP of this. Erections are better than before and now satisfactory. Notes he does not have ejaculate which he is not concerned about. No hx of kidney stones. Additionally states he has some left lumbar pain that does not change with position but states he feels it in his muscle. \par \par 03/31/2022: Patient presents today for a follow up visit. He is currently taking Oxybutynin and Silodosin. He is experiencing dry mouth, rated at about 5/10. His symptoms are mostly the same. He notes he feels moistness in his groin while driving, but the area is not wet. He reports when he started the Silodosin, his BP stabilized. Since beginning the Silodosin, he also notes acid reflux. It worsened after beginning the Oxybutynin. He had been given Pantoprazole in the past for the acid reflux without relief. He also uses Tums. He is concerned that the acid reflux is caused by H. pylori, which he had in the past when he moved to the [place]. He agrees to follow with his GI regarding H. pylori. His BP today was 137/77. He notes concern of a progesterone of 0.2, which was mildly elevated. Pt is prediabetic. \par \par I recommend he discontinue the Oxybutynin to see if his acid reflux improves without the medication. He should continue following with his gastroenterologist for the heart burn. He should continue taking the Silodosin. I discussed using Myrbetriq in the future if he cannot increase the dose of Oxybutynin due to the side effects. I advised him to not be worried about the mildly elevated progesterone as it was not a problem. I am referring the patient to Dr. Lele Connors or Dr. Evangelist Painter\par \par RTO in one month. \par \par Preparation, in-person office visit, and coordination of care took: 30 minutes

## 2022-04-03 NOTE — ADDENDUM
[FreeTextEntry1] : I, Maryam Dorado, acted solely as a scribe for Dr. Chente Harper on this date 03/31/2022.\par \par All medical record entries made by the Scribe were at my, Dr. Chente Harper, direction and personally dictated by me on 03/31/2022. I have reviewed the chart and agree that the record accurately reflects my personal performance of the history, physical exam, assessment and plan. I have also personally directed, reviewed and agreed with the chart.

## 2022-04-04 ENCOUNTER — TRANSCRIPTION ENCOUNTER (OUTPATIENT)
Age: 67
End: 2022-04-04

## 2022-04-05 NOTE — ED CDU PROVIDER INITIAL DAY NOTE - NEUROLOGICAL, MLM
Refill request     Medication pended if agreeable    Last Appt:  5/4/2020  Next Appt:   Visit date not found  Med verified in Epic Alert and oriented, no focal deficits, no motor or sensory deficits.

## 2022-04-06 LAB
TESTOST FREE SERPL-MCNC: 7.7 PG/ML
TESTOST SERPL-MCNC: 233 NG/DL

## 2022-07-19 ENCOUNTER — APPOINTMENT (OUTPATIENT)
Dept: UROLOGY | Facility: CLINIC | Age: 67
End: 2022-07-19

## 2022-07-19 VITALS
OXYGEN SATURATION: 99 % | TEMPERATURE: 97.6 F | SYSTOLIC BLOOD PRESSURE: 129 MMHG | HEART RATE: 66 BPM | RESPIRATION RATE: 17 BRPM | DIASTOLIC BLOOD PRESSURE: 71 MMHG

## 2022-07-19 PROCEDURE — 99215 OFFICE O/P EST HI 40 MIN: CPT

## 2022-07-19 RX ORDER — OXYBUTYNIN CHLORIDE 5 MG/1
5 TABLET, EXTENDED RELEASE ORAL DAILY
Qty: 30 | Refills: 11 | Status: COMPLETED | COMMUNITY
Start: 2022-01-19 | End: 2022-07-19

## 2022-07-21 ENCOUNTER — NON-APPOINTMENT (OUTPATIENT)
Age: 67
End: 2022-07-21

## 2022-07-22 LAB
APPEARANCE: CLEAR
BACTERIA UR CULT: NORMAL
BACTERIA: NEGATIVE
BILIRUBIN URINE: NEGATIVE
BLOOD URINE: NEGATIVE
COLOR: NORMAL
GLUCOSE QUALITATIVE U: NEGATIVE
HYALINE CASTS: 0 /LPF
KETONES URINE: NEGATIVE
LEUKOCYTE ESTERASE URINE: NEGATIVE
MICROSCOPIC-UA: NORMAL
NITRITE URINE: NEGATIVE
PH URINE: 7
PROTEIN URINE: NEGATIVE
RED BLOOD CELLS URINE: 1 /HPF
SPECIFIC GRAVITY URINE: 1.02
SQUAMOUS EPITHELIAL CELLS: 0 /HPF
URINE CYTOLOGY: NORMAL
UROBILINOGEN URINE: NORMAL
WHITE BLOOD CELLS URINE: 0 /HPF

## 2022-07-25 NOTE — REVIEW OF SYSTEMS
[Negative] : Heme/Lymph [Heartburn] : heartburn [Initiating Urination Req. Strain] : initiating urination requires straining [Erectile Dysfunction] : no erectile dysfunction [Libido Decreased] : libido not decreased

## 2022-07-25 NOTE — ASSESSMENT
[FreeTextEntry1] : 10/08/2021: Mr. Mc is a 67 year old male presenting today for a urological assessment. He is currently experiencing urinary urgency, specifically when he gets up after driving for an extended period of time. He noticed it for the first time about 4-5 months ago. Sometimes he feels moistness in his groin. His family doctor prescribed Finasteride 5 mg, one tablet daily. Denies urge incontinence. There is also terminal dribbling when he finishes urinating. A few years ago after he began taking blood pressure medication, he noticed his penis is bent and he lost about 80% erection function. In the past 2-3 months, his erections have improved and he is happy with the status. The erections are now 75% adequate. He reports feeling a fibrous plaque on the base of the penis. One year ago before beginning Finasteride, his PSA was 3.9 ng/mL. After 2 months of the Finasteride, his PSA was about 0.9 ng/mL and his creatinine was within normal limits. He was also treated at the time with Ciprofloxacin for prostatitis. The family doctor did a rectal exam and reported that the prostate was smooth, normal texture, but was enlarged. The patient has lower left flank pain and discomfort in his left groin. He is managing heartburn and constipation. He has had cataract surgery on both eyes. He takes Losartan potassium 25 mg and Simvastatin. No FHx of prostate cancer or kidney stones. No PMHx of kidney stones. No history of seizures. He has been evaluated and diagnosed with vasovagal syncope. \par \par 10/29/2021: Patient presents today for follow up. He has continued urinary urgency that has improved about 10% with the Tamsulosin 0.4 mg. He has had no lightheadedness, but notes that his blood pressure dropped to 90/60. Since then, he has stopped taking his Losartan and his blood pressure returned to normal. His PSA recently was 0.9 ng/mL, which he verbalized. His blood work from 10/9/21 showed a TSH was normal and his creatinine was 1.01 and alkaline phos was normal at 73. \par \par 01/19/2022: Patient presents today for follow up. Currently on silodosin 8mg once daily and urinary symptoms are significantly improved. Urinary urgency is 90% improved. Leaks very few drops when he has urgency. States he has intermittent constipation. States that he stopped his Losartan due to BP being too low, and now BP is normal and he feels okay. Did notify his PCP of this. Erections are better than before and now satisfactory. Notes he does not have ejaculate which he is not concerned about. No hx of kidney stones. Additionally states he has some left lumbar pain that does not change with position but states he feels it in his muscle. \par \par 03/31/2022: Patient presents today for a follow up visit. He is currently taking Oxybutynin and Silodosin. He is experiencing dry mouth, rated at about 5/10. His symptoms are mostly the same. He notes he feels moistness in his groin while driving, but the area is not wet. He reports when he started the Silodosin, his BP stabilized. Since beginning the Silodosin, he also notes acid reflux. It worsened after beginning the Oxybutynin. He had been given Pantoprazole in the past for the acid reflux without relief. He also uses Tums. He is concerned that the acid reflux is caused by H. pylori, which he had in the past when he moved to the [place]. He agrees to follow with his GI regarding H. pylori. His BP today was 137/77. He notes concern of a progesterone of 0.2, which was mildly elevated. Pt is prediabetic. \par \par I recommend he discontinue the Oxybutynin to see if his acid reflux improves without the medication. He should continue following with his gastroenterologist for the heart burn. He should continue taking the Silodosin. I discussed using Myrbetriq in the future if he cannot increase the dose of Oxybutynin due to the side effects. I advised him to not be worried about the mildly elevated progesterone as it was not a problem. I am referring the patient to Dr. Lele Connors or Dr. Evangelist Painter\par RTO in one month. \par \par 07/19/2022:  presents today for a follow up. He states that he is seeing a gastroenterologist in [facility] who has him scheduled for an endoscopy this week. He has been given Nexium and famotidine for his heartburn, which has brought him significant relief. In regards to the patient's urination, he feels it is about the same, maybe slightly improved. He still has some urinary urgency and minor urge incontinence. When he was on oxybutynin, he experienced heartburn and found it to not be that beneficial to his urination. He continues to take silodosin 8 mg once daily with food. He is able to hold his urine for about 2 hours, however if he drinks a lot of water he can hold it for about an hour. He drinks about 3L of water a day minimum. When he stands to urinate, he admits to pushing, but if he sits he has a pretty easy flow. He reports that sexual function is fairly good, however due to silodosin, he has decreased semen volume. Sexual desire remains good. He has found that reaching climax takes more time. The patient denies FHx and PMHx of kidney stones. Denies FHx of prostate cancer. Denies ever smoking. 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 \par \par The patient produced a urine sample which will be sent for urinalysis, urine cytology, and urine culture. \par \par The knee-chest position was utilized for the PHIL. Digital rectal exam found no suspicious rectal masses. Normal seminal vesicles. Anal tone is normal. The prostate is non tender, with normal texture, discrete borders, and no nodules. It is a 25 gram transurethral resection size. No rectal mucosal lesions. No gross blood on the examining finger. \par \par Reviewed and discussed 3/31/2022 laboratory work results in detail. PSA was nice and low at 1.03. \par \par I requested that the patient have his endoscopy report sent to my office for me to review after it is done. He also stated he had some abdominal imaging done at [facility], which I also requested be sent to my office. \par \par I prescribed the patient myrbetriq 25 mg, one tablet daily. I informed the patient of the risk of increasing blood pressure and urinary retention. Pt will monitor his BP at home. I reviewed his past BP's today. He will continue taking silodosin 8 mg once daily, prescription renewed today. If Myrbetriq works well for him, we can try switching silodosin to alfuzosin in an effort to preserving semen volume. \par \par The patient inquired if there were any procedures for the prostate that would be beneficial to him. We discussed options including a TURP, green light laser, holmium laser enucleation, and a urolift. Rationale, benefits, risks, and alternatives reviewed. I explained that it is my recommendation he continues with medication management and pt was agreeable but was curious to know his options. \par  \par Patient will RTO in about 1 month for reassessment on Myrbetriq. \par \par Preparation, in person office visit, and coordination of care: 40 minutes.

## 2022-07-25 NOTE — PHYSICAL EXAM
[General Appearance - Well Developed] : well developed [General Appearance - Well Nourished] : well nourished [Normal Appearance] : normal appearance [Well Groomed] : well groomed [General Appearance - In No Acute Distress] : no acute distress [Abdomen Soft] : soft [Abdomen Tenderness] : non-tender [Edema] : no peripheral edema [] : no respiratory distress [Respiration, Rhythm And Depth] : normal respiratory rhythm and effort [Exaggerated Use Of Accessory Muscles For Inspiration] : no accessory muscle use [Oriented To Time, Place, And Person] : oriented to person, place, and time [Affect] : the affect was normal [Mood] : the mood was normal [Not Anxious] : not anxious [Normal Station and Gait] : the gait and station were normal for the patient's age [No Focal Deficits] : no focal deficits [FreeTextEntry1] : \par The knee-chest position was utilized for the PHIL. Digital rectal exam found no suspicious rectal masses. Normal seminal vesicles. Anal tone is normal. The prostate is non tender, with normal texture, discrete borders, and no nodules. It is a 25 gram transurethral resection size. No rectal mucosal lesions. No gross blood on the examining finger.

## 2022-07-25 NOTE — HISTORY OF PRESENT ILLNESS
[FreeTextEntry1] : 10/08/2021: Mr. Mc is a 67 year old male presenting today for a urological assessment. He is currently experiencing urinary urgency, specifically when he gets up after driving for an extended period of time. He noticed it for the first time about 4-5 months ago. Sometimes he feels moistness in his groin. His family doctor prescribed Finasteride 5 mg, one tablet daily. Denies urge incontinence. There is also terminal dribbling when he finishes urinating. A few years ago after he began taking blood pressure medication, he noticed his penis is bent and he lost about 80% erection function. In the past 2-3 months, his erections have improved and he is happy with the status. The erections are now 75% adequate. He reports feeling a fibrous plaque on the base of the penis. One year ago before beginning Finasteride, his PSA was 3.9 ng/mL. After 2 months of the Finasteride, his PSA was about 0.9 ng/mL and his creatinine was within normal limits. He was also treated at the time with Ciprofloxacin for prostatitis. The family doctor did a rectal exam and reported that the prostate was smooth, normal texture, but was enlarged. The patient has lower left flank pain and discomfort in his left groin. He is managing heartburn and constipation. He has had cataract surgery on both eyes. He takes Losartan potassium 25 mg and Simvastatin. No FHx of prostate cancer or kidney stones. No PMHx of kidney stones. No history of seizures. He has been evaluated and diagnosed with vasovagal syncope.  \par \par 10/29/2021: Patient presents today for follow up. He has continued urinary urgency that has improved about 10% with the Tamsulosin 0.4 mg. He has had no lightheadedness, but notes that his blood pressure dropped to 90/60. Since then, he has stopped taking his Losartan and his blood pressure returned to normal. His PSA recently was 0.9 ng/mL, which he verbalized. His blood work from 10/9/21 showed a TSH was normal and his creatinine was 1.01 and alkaline phos was normal at 73. \par \par 01/19/2022: Patient presents today for follow up. Currently on silodosin 8mg once daily and urinary symptoms are significantly improved. Urinary urgency is 90% improved. Leaks very few drops when he has urgency. States he has intermittent constipation. States that he stopped his Losartan due to BP being too low, and now BP is normal and he feels okay. Did notify his PCP of this. Erections are better than before and now satisfactory. Notes he does not have ejaculate which he is not concerned about. No hx of kidney stones. Additionally states he has some left lumbar pain that does not change with position but states he feels it in his muscle. \par \par 07/19/2022:  presents today for a follow up. He states that he is seeing a gastroenterologist in [facility] who has him scheduled for an endoscopy this week. He has been given Nexium and famotidine for his heartburn, which has brought him significant relief. In regards to the patient's urination, he feels it is about the same, maybe slightly improved. He still has some urinary urgency and minor urge incontinence. When he was on oxybutynin, he experienced heartburn and found it to not be that beneficial to his urination. He continues to take silodosin 8 mg once daily with food. He is able to hold his urine for about 2 hours, however if he drinks a lot of water he can hold it for about an hour. He drinks about 3L of water a day minimum. When he stands to urinate, he admits to pushing, but if he sits he has a pretty easy flow. He reports that sexual function is fairly good, however due to silodosin, he has decreased semen volume. Sexual desire is good. He has found that reaching climax takes more time. The patient denies FHx and PMHx of kidney stones. Denies FHx of prostate cancer. Denies ever smoking. 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

## 2022-07-25 NOTE — ADDENDUM
[FreeTextEntry1] : This note was authored by Nicole Frankel working as a scribe for Dr.Gary Harper. I, Dr. Chente Harper have reviewed the content of this note and confirm it is true and accurate. I personally performed the history and physical examination and made all the decisions 07/19/2022.

## 2022-08-15 NOTE — ED PROVIDER NOTE - TEMPLATE, MLM
Head,  normocephalic,  atraumatic,  Face,  Face within normal limits,  Ears,  External ears within normal limits,  Nose/Nasopharynx,  External nose  normal appearance,  nares patent,  no nasal discharge,  Mouth and Throat,  Oral cavity appearance normal,  Breath odor normal,  Lips,  Appearance normal , Head,  normocephalic,  atraumatic,  Face,  Face within normal limits,  Ears,  External ears within normal limits,  Nose/Nasopharynx,  External nose  normal appearance,  nares patent,  no nasal discharge,  Mouth and Throat,  Oral cavity appearance normal,  Breath odor normal,  Lips,  Appearance normal
Cardiac

## 2023-06-06 ENCOUNTER — APPOINTMENT (OUTPATIENT)
Dept: UROLOGY | Facility: CLINIC | Age: 68
End: 2023-06-06

## 2023-06-15 ENCOUNTER — APPOINTMENT (OUTPATIENT)
Dept: UROLOGY | Facility: CLINIC | Age: 68
End: 2023-06-15
Payer: MEDICARE

## 2023-06-15 VITALS
TEMPERATURE: 97.8 F | HEART RATE: 76 BPM | HEIGHT: 67 IN | DIASTOLIC BLOOD PRESSURE: 74 MMHG | SYSTOLIC BLOOD PRESSURE: 131 MMHG | RESPIRATION RATE: 16 BRPM | BODY MASS INDEX: 23.54 KG/M2 | WEIGHT: 150 LBS | OXYGEN SATURATION: 97 %

## 2023-06-15 PROCEDURE — 99214 OFFICE O/P EST MOD 30 MIN: CPT

## 2023-06-18 NOTE — REVIEW OF SYSTEMS
[Heartburn] : heartburn [Initiating Urination Req. Strain] : initiating urination requires straining [Negative] : Heme/Lymph [Erectile Dysfunction] : no erectile dysfunction [Libido Decreased] : libido not decreased

## 2023-06-18 NOTE — PHYSICAL EXAM
[General Appearance - Well Developed] : well developed [General Appearance - Well Nourished] : well nourished [Normal Appearance] : normal appearance [Well Groomed] : well groomed [General Appearance - In No Acute Distress] : no acute distress [Abdomen Soft] : soft [Abdomen Tenderness] : non-tender [Edema] : no peripheral edema [] : no respiratory distress [Respiration, Rhythm And Depth] : normal respiratory rhythm and effort [Exaggerated Use Of Accessory Muscles For Inspiration] : no accessory muscle use [Oriented To Time, Place, And Person] : oriented to person, place, and time [Affect] : the affect was normal [Mood] : the mood was normal [Not Anxious] : not anxious [Normal Station and Gait] : the gait and station were normal for the patient's age [No Focal Deficits] : no focal deficits [FreeTextEntry1] : The knee-chest position was utilized for the PHIL. Digital rectal exam found no suspicious rectal masses. Normal seminal vesicles. Anal tone is normal. The prostate is non tender, with normal texture, discrete borders, and no nodules. It is a 25 gram transurethral resection size. No rectal mucosal lesions. No gross blood on the examining finger.

## 2023-06-18 NOTE — ADDENDUM
[FreeTextEntry1] : This note was authored by Nicole Frankel working as a scribe for Dr.Gary Harper. I, Dr. Chente Harper have reviewed the content of this note and confirm it is true and accurate. I personally performed the history and physical examination and made all the decisions 06/15/2023

## 2023-06-18 NOTE — HISTORY OF PRESENT ILLNESS
[FreeTextEntry1] : 10/08/2021: Mr. Mc is a 67 year old male presenting today for a urological assessment. He is currently experiencing urinary urgency, specifically when he gets up after driving for an extended period of time. He noticed it for the first time about 4-5 months ago. Sometimes he feels moistness in his groin. His family doctor prescribed Finasteride 5 mg, one tablet daily. Denies urge incontinence. There is also terminal dribbling when he finishes urinating. A few years ago after he began taking blood pressure medication, he noticed his penis is bent and he lost about 80% erection function. In the past 2-3 months, his erections have improved and he is happy with the status. The erections are now 75% adequate. He reports feeling a fibrous plaque on the base of the penis. One year ago before beginning Finasteride, his PSA was 3.9 ng/mL. After 2 months of the Finasteride, his PSA was about 0.9 ng/mL and his creatinine was within normal limits. He was also treated at the time with Ciprofloxacin for prostatitis. The family doctor did a rectal exam and reported that the prostate was smooth, normal texture, but was enlarged. The patient has lower left flank pain and discomfort in his left groin. He is managing heartburn and constipation. He has had cataract surgery on both eyes. He takes Losartan potassium 25 mg and Simvastatin. No FHx of prostate cancer or kidney stones. No PMHx of kidney stones. No history of seizures. He has been evaluated and diagnosed with vasovagal syncope.  \par \par 10/29/2021: Patient presents today for follow up. He has continued urinary urgency that has improved about 10% with the Tamsulosin 0.4 mg. He has had no lightheadedness, but notes that his blood pressure dropped to 90/60. Since then, he has stopped taking his Losartan and his blood pressure returned to normal. His PSA recently was 0.9 ng/mL, which he verbalized. His blood work from 10/9/21 showed a TSH was normal and his creatinine was 1.01 and alkaline phos was normal at 73. \par \par 01/19/2022: Patient presents today for follow up. Currently on silodosin 8mg once daily and urinary symptoms are significantly improved. Urinary urgency is 90% improved. Leaks very few drops when he has urgency. States he has intermittent constipation. States that he stopped his Losartan due to BP being too low, and now BP is normal and he feels okay. Did notify his PCP of this. Erections are better than before and now satisfactory. Notes he does not have ejaculate which he is not concerned about. No hx of kidney stones. Additionally states he has some left lumbar pain that does not change with position but states he feels it in his muscle. \par \par 07/19/2022:  presents today for a follow up. He states that he is seeing a gastroenterologist in [facility] who has him scheduled for an endoscopy this week. He has been given Nexium and famotidine for his heartburn, which has brought him significant relief. In regards to the patient's urination, he feels it is about the same, maybe slightly improved. He still has some urinary urgency and minor urge incontinence. When he was on oxybutynin, he experienced heartburn and found it to not be that beneficial to his urination. He continues to take silodosin 8 mg once daily with food. He is able to hold his urine for about 2 hours, however if he drinks a lot of water he can hold it for about an hour. He drinks about 3L of water a day minimum. When he stands to urinate, he admits to pushing, but if he sits he has a pretty easy flow. He reports that sexual function is fairly good, however due to silodosin, he has decreased semen volume. Sexual desire is good. He has found that reaching climax takes more time. The patient denies FHx and PMHx of kidney stones. Denies FHx of prostate cancer. Denies ever smoking. 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 \par \par 06/15/2023: Mr. DAYDAY MC presents today for a follow up. He is accompanied by his wife. He reports that for a while he went without insurance due to his company shutting down. He was without his medications for quite a while and did not notice a significant decline in urination. He feels his urination is fairly good. Denies nocturia. Occasional urinary urgency but denies urge incontinence. When he is close to the bathroom the pressure increases. Sexual desire is fair. Erections are improved since coming off his medications. Pt walks a lot for exercise.

## 2023-06-24 LAB
APPEARANCE: CLEAR
BACTERIA UR CULT: NORMAL
BACTERIA: NEGATIVE /HPF
BILIRUBIN URINE: NEGATIVE
BLOOD URINE: NEGATIVE
CAST: 0 /LPF
COLOR: YELLOW
EPITHELIAL CELLS: 0 /HPF
GLUCOSE QUALITATIVE U: NEGATIVE MG/DL
KETONES URINE: ABNORMAL MG/DL
LEUKOCYTE ESTERASE URINE: NEGATIVE
MICROSCOPIC-UA: NORMAL
NITRITE URINE: NEGATIVE
PH URINE: 6
PROTEIN URINE: NEGATIVE MG/DL
RED BLOOD CELLS URINE: 1 /HPF
SPECIFIC GRAVITY URINE: 1.02
URINE CYTOLOGY: NORMAL
UROBILINOGEN URINE: 1 MG/DL
WHITE BLOOD CELLS URINE: 0 /HPF

## 2023-07-06 ENCOUNTER — APPOINTMENT (OUTPATIENT)
Dept: UROLOGY | Facility: CLINIC | Age: 68
End: 2023-07-06

## 2024-06-03 ENCOUNTER — NON-APPOINTMENT (OUTPATIENT)
Age: 69
End: 2024-06-03

## 2024-06-03 ENCOUNTER — APPOINTMENT (OUTPATIENT)
Dept: CARDIOLOGY | Facility: CLINIC | Age: 69
End: 2024-06-03

## 2024-06-03 VITALS
OXYGEN SATURATION: 99 % | SYSTOLIC BLOOD PRESSURE: 148 MMHG | HEART RATE: 54 BPM | BODY MASS INDEX: 23.49 KG/M2 | WEIGHT: 150 LBS | DIASTOLIC BLOOD PRESSURE: 81 MMHG

## 2024-06-03 DIAGNOSIS — E78.5 HYPERLIPIDEMIA, UNSPECIFIED: ICD-10-CM

## 2024-06-03 DIAGNOSIS — I10 ESSENTIAL (PRIMARY) HYPERTENSION: ICD-10-CM

## 2024-06-03 DIAGNOSIS — Z83.438 FAMILY HISTORY OF OTHER DISORDER OF LIPOPROTEIN METABOLISM AND OTHER LIPIDEMIA: ICD-10-CM

## 2024-06-03 DIAGNOSIS — Z82.49 FAMILY HISTORY OF ISCHEMIC HEART DISEASE AND OTHER DISEASES OF THE CIRCULATORY SYSTEM: ICD-10-CM

## 2024-06-03 DIAGNOSIS — Z78.9 OTHER SPECIFIED HEALTH STATUS: ICD-10-CM

## 2024-06-03 PROCEDURE — 99205 OFFICE O/P NEW HI 60 MIN: CPT | Mod: 25

## 2024-06-03 PROCEDURE — G2211 COMPLEX E/M VISIT ADD ON: CPT | Mod: NC

## 2024-06-03 PROCEDURE — 93000 ELECTROCARDIOGRAM COMPLETE: CPT

## 2024-06-03 RX ORDER — LOSARTAN POTASSIUM 25 MG/1
25 TABLET, FILM COATED ORAL
Qty: 180 | Refills: 3 | Status: ACTIVE | COMMUNITY

## 2024-06-03 RX ORDER — SILODOSIN 8 MG/1
8 CAPSULE ORAL DAILY
Qty: 90 | Refills: 3 | Status: DISCONTINUED | COMMUNITY
Start: 2021-10-29 | End: 2024-06-03

## 2024-06-03 RX ORDER — SIMVASTATIN 20 MG/1
20 TABLET, FILM COATED ORAL
Refills: 0 | Status: ACTIVE | COMMUNITY

## 2024-06-03 RX ORDER — CARVEDILOL 6.25 MG/1
6.25 TABLET, FILM COATED ORAL
Qty: 180 | Refills: 3 | Status: ACTIVE | COMMUNITY

## 2024-06-03 RX ORDER — ASPIRIN ENTERIC COATED TABLETS 81 MG 81 MG/1
81 TABLET, DELAYED RELEASE ORAL
Refills: 0 | Status: ACTIVE | COMMUNITY

## 2024-06-03 RX ORDER — MIRABEGRON 25 MG/1
25 TABLET, FILM COATED, EXTENDED RELEASE ORAL
Qty: 30 | Refills: 11 | Status: DISCONTINUED | COMMUNITY
Start: 2022-07-19 | End: 2024-06-03

## 2024-06-03 RX ORDER — TOLTERODINE TARTARATE 2 MG/1
2 TABLET ORAL
Qty: 30 | Refills: 11 | Status: DISCONTINUED | COMMUNITY
Start: 2022-07-21 | End: 2024-06-03

## 2024-06-19 ENCOUNTER — OUTPATIENT (OUTPATIENT)
Dept: OUTPATIENT SERVICES | Facility: HOSPITAL | Age: 69
LOS: 1 days | End: 2024-06-19
Payer: COMMERCIAL

## 2024-06-19 ENCOUNTER — TRANSCRIPTION ENCOUNTER (OUTPATIENT)
Age: 69
End: 2024-06-19

## 2024-06-19 VITALS
RESPIRATION RATE: 15 BRPM | DIASTOLIC BLOOD PRESSURE: 60 MMHG | HEART RATE: 61 BPM | OXYGEN SATURATION: 97 % | SYSTOLIC BLOOD PRESSURE: 122 MMHG

## 2024-06-19 VITALS
OXYGEN SATURATION: 98 % | DIASTOLIC BLOOD PRESSURE: 74 MMHG | HEART RATE: 64 BPM | TEMPERATURE: 98 F | RESPIRATION RATE: 15 BRPM | SYSTOLIC BLOOD PRESSURE: 143 MMHG | WEIGHT: 149.91 LBS | HEIGHT: 67 IN

## 2024-06-19 DIAGNOSIS — R94.39 ABNORMAL RESULT OF OTHER CARDIOVASCULAR FUNCTION STUDY: ICD-10-CM

## 2024-06-19 LAB
ANION GAP SERPL CALC-SCNC: 14 MMOL/L — SIGNIFICANT CHANGE UP (ref 5–17)
BUN SERPL-MCNC: 17 MG/DL — SIGNIFICANT CHANGE UP (ref 7–23)
CALCIUM SERPL-MCNC: 9.6 MG/DL — SIGNIFICANT CHANGE UP (ref 8.4–10.5)
CHLORIDE SERPL-SCNC: 101 MMOL/L — SIGNIFICANT CHANGE UP (ref 96–108)
CO2 SERPL-SCNC: 25 MMOL/L — SIGNIFICANT CHANGE UP (ref 22–31)
CREAT SERPL-MCNC: 1.28 MG/DL — SIGNIFICANT CHANGE UP (ref 0.5–1.3)
EGFR: 62 ML/MIN/1.73M2 — SIGNIFICANT CHANGE UP
GLUCOSE SERPL-MCNC: 128 MG/DL — HIGH (ref 70–99)
HCT VFR BLD CALC: 42.4 % — SIGNIFICANT CHANGE UP (ref 39–50)
HGB BLD-MCNC: 13.7 G/DL — SIGNIFICANT CHANGE UP (ref 13–17)
MCHC RBC-ENTMCNC: 27.8 PG — SIGNIFICANT CHANGE UP (ref 27–34)
MCHC RBC-ENTMCNC: 32.3 GM/DL — SIGNIFICANT CHANGE UP (ref 32–36)
MCV RBC AUTO: 86.2 FL — SIGNIFICANT CHANGE UP (ref 80–100)
NRBC # BLD: 0 /100 WBCS — SIGNIFICANT CHANGE UP (ref 0–0)
PLATELET # BLD AUTO: 216 K/UL — SIGNIFICANT CHANGE UP (ref 150–400)
POTASSIUM SERPL-MCNC: 3.5 MMOL/L — SIGNIFICANT CHANGE UP (ref 3.5–5.3)
POTASSIUM SERPL-SCNC: 3.5 MMOL/L — SIGNIFICANT CHANGE UP (ref 3.5–5.3)
RBC # BLD: 4.92 M/UL — SIGNIFICANT CHANGE UP (ref 4.2–5.8)
RBC # FLD: 13.7 % — SIGNIFICANT CHANGE UP (ref 10.3–14.5)
SODIUM SERPL-SCNC: 140 MMOL/L — SIGNIFICANT CHANGE UP (ref 135–145)
WBC # BLD: 10.24 K/UL — SIGNIFICANT CHANGE UP (ref 3.8–10.5)
WBC # FLD AUTO: 10.24 K/UL — SIGNIFICANT CHANGE UP (ref 3.8–10.5)

## 2024-06-19 PROCEDURE — 36415 COLL VENOUS BLD VENIPUNCTURE: CPT

## 2024-06-19 PROCEDURE — 85027 COMPLETE CBC AUTOMATED: CPT

## 2024-06-19 PROCEDURE — 93005 ELECTROCARDIOGRAM TRACING: CPT

## 2024-06-19 PROCEDURE — 93454 CORONARY ARTERY ANGIO S&I: CPT

## 2024-06-19 PROCEDURE — C1769: CPT

## 2024-06-19 PROCEDURE — 93454 CORONARY ARTERY ANGIO S&I: CPT | Mod: 26

## 2024-06-19 PROCEDURE — 80048 BASIC METABOLIC PNL TOTAL CA: CPT

## 2024-06-19 PROCEDURE — 99152 MOD SED SAME PHYS/QHP 5/>YRS: CPT

## 2024-06-19 PROCEDURE — C1887: CPT

## 2024-06-19 PROCEDURE — C1894: CPT

## 2024-06-19 PROCEDURE — 93010 ELECTROCARDIOGRAM REPORT: CPT

## 2024-06-19 RX ORDER — LOSARTAN POTASSIUM 100 MG/1
1 TABLET, FILM COATED ORAL
Refills: 0 | DISCHARGE

## 2024-06-19 RX ORDER — ASPIRIN 325 MG/1
0 TABLET, FILM COATED ORAL
Refills: 0 | DISCHARGE

## 2024-06-24 ENCOUNTER — APPOINTMENT (OUTPATIENT)
Dept: UROLOGY | Facility: CLINIC | Age: 69
End: 2024-06-24
Payer: MEDICARE

## 2024-06-24 DIAGNOSIS — R79.0 ABNORMAL LVL OF BLOOD MINERAL: ICD-10-CM

## 2024-06-24 DIAGNOSIS — N40.1 BENIGN PROSTATIC HYPERPLASIA WITH LOWER URINARY TRACT SYMPMS: ICD-10-CM

## 2024-06-24 DIAGNOSIS — R73.09 OTHER ABNORMAL GLUCOSE: ICD-10-CM

## 2024-06-24 DIAGNOSIS — N39.41 URGE INCONTINENCE: ICD-10-CM

## 2024-06-24 DIAGNOSIS — N13.8 BENIGN PROSTATIC HYPERPLASIA WITH LOWER URINARY TRACT SYMPMS: ICD-10-CM

## 2024-06-24 DIAGNOSIS — R94.39 ABNORMAL RESULT OF OTHER CARDIOVASCULAR FUNCTION STUDY: ICD-10-CM

## 2024-06-24 DIAGNOSIS — R79.89 OTHER SPECIFIED ABNORMAL FINDINGS OF BLOOD CHEMISTRY: ICD-10-CM

## 2024-06-24 PROBLEM — R55 SYNCOPE AND COLLAPSE: Chronic | Status: ACTIVE | Noted: 2024-06-19

## 2024-06-24 PROCEDURE — 99214 OFFICE O/P EST MOD 30 MIN: CPT

## 2024-06-24 RX ORDER — SILODOSIN 4 MG/1
4 CAPSULE ORAL
Qty: 60 | Refills: 11 | Status: ACTIVE | COMMUNITY
Start: 2024-06-24 | End: 1900-01-01

## 2024-06-25 LAB
ALBUMIN SERPL ELPH-MCNC: 4.6 G/DL
ALP BLD-CCNC: 72 U/L
ANION GAP SERPL CALC-SCNC: 12 MMOL/L
APPEARANCE: CLEAR
BACTERIA: NEGATIVE /HPF
BILIRUBIN URINE: NEGATIVE
BLOOD URINE: NEGATIVE
BUN SERPL-MCNC: 10 MG/DL
CALCIUM SERPL-MCNC: 9.1 MG/DL
CAST: 0 /LPF
CHLORIDE SERPL-SCNC: 103 MMOL/L
CO2 SERPL-SCNC: 25 MMOL/L
COLOR: YELLOW
CREAT SERPL-MCNC: 1.23 MG/DL
CYSTATIN C SERPL-MCNC: 0.98 MG/L
EGFR: 65 ML/MIN/1.73M2
EPITHELIAL CELLS: 0 /HPF
ESTIMATED AVERAGE GLUCOSE: 123 MG/DL
ESTRADIOL SERPL-MCNC: 27 PG/ML
FSH SERPL-MCNC: 3 IU/L
GFR/BSA.PRED SERPLBLD CYS-BASED-ARV: 78 ML/MIN/1.73M2
GLUCOSE QUALITATIVE U: NEGATIVE MG/DL
GLUCOSE SERPL-MCNC: 110 MG/DL
HBA1C MFR BLD HPLC: 5.9 %
KETONES URINE: NEGATIVE MG/DL
LEUKOCYTE ESTERASE URINE: NEGATIVE
LH SERPL-ACNC: 3 IU/L
MICROSCOPIC-UA: NORMAL
NITRITE URINE: NEGATIVE
PH URINE: 6
PHOSPHATE SERPL-MCNC: 4.1 MG/DL
POTASSIUM SERPL-SCNC: 4 MMOL/L
PROTEIN URINE: NEGATIVE MG/DL
PSA FREE FLD-MCNC: 32 %
PSA FREE SERPL-MCNC: 0.38 NG/ML
PSA SERPL-MCNC: 1.21 NG/ML
RED BLOOD CELLS URINE: 0 /HPF
SHBG SERPL-SCNC: 30.5 NMOL/L
SODIUM SERPL-SCNC: 140 MMOL/L
SPECIFIC GRAVITY URINE: 1.01
URINE CYTOLOGY: NORMAL
UROBILINOGEN URINE: 0.2 MG/DL
WHITE BLOOD CELLS URINE: 0 /HPF

## 2024-06-26 LAB
BACTERIA UR CULT: NORMAL
TESTOST FREE SERPL-MCNC: 8.5 PG/ML
TESTOST SERPL-MCNC: 295 NG/DL

## 2024-06-28 PROBLEM — R73.09 INCREASED GLUCOSE LEVEL: Status: ACTIVE | Noted: 2021-11-14

## 2024-06-28 PROBLEM — N39.41 URINARY INCONTINENCE, URGE: Status: ACTIVE | Noted: 2021-10-10

## 2024-06-28 PROBLEM — R79.89 LOW TESTOSTERONE IN MALE: Status: ACTIVE | Noted: 2021-11-14

## 2024-06-28 PROBLEM — R94.39 POSITIVE CARDIAC STRESS TEST: Status: ACTIVE | Noted: 2024-06-03

## 2024-06-28 PROBLEM — N40.1 BENIGN LOCALIZED HYPERPLASIA OF PROSTATE WITH URINARY OBSTRUCTION: Status: ACTIVE | Noted: 2021-10-10

## 2024-06-28 PROBLEM — R79.0 LOW SERUM PHOSPHORUS FOR AGE: Status: ACTIVE | Noted: 2021-11-14

## 2024-06-28 LAB
ESTROGEN SERPL-MCNC: 103 PG/ML
ESTRONE SERPL-MCNC: 39 PG/ML

## 2024-07-08 ENCOUNTER — APPOINTMENT (OUTPATIENT)
Dept: CARDIOLOGY | Facility: CLINIC | Age: 69
End: 2024-07-08

## 2024-07-08 VITALS — SYSTOLIC BLOOD PRESSURE: 131 MMHG | OXYGEN SATURATION: 97 % | HEART RATE: 63 BPM | DIASTOLIC BLOOD PRESSURE: 75 MMHG

## 2024-07-08 PROCEDURE — G2211 COMPLEX E/M VISIT ADD ON: CPT | Mod: NC

## 2024-07-08 PROCEDURE — 93000 ELECTROCARDIOGRAM COMPLETE: CPT

## 2024-07-08 PROCEDURE — 99215 OFFICE O/P EST HI 40 MIN: CPT | Mod: 25

## 2024-07-09 ENCOUNTER — APPOINTMENT (OUTPATIENT)
Dept: UROLOGY | Facility: CLINIC | Age: 69
End: 2024-07-09
Payer: MEDICARE

## 2024-07-09 DIAGNOSIS — R94.39 ABNORMAL RESULT OF OTHER CARDIOVASCULAR FUNCTION STUDY: ICD-10-CM

## 2024-07-09 DIAGNOSIS — N13.8 BENIGN PROSTATIC HYPERPLASIA WITH LOWER URINARY TRACT SYMPMS: ICD-10-CM

## 2024-07-09 DIAGNOSIS — R79.89 OTHER SPECIFIED ABNORMAL FINDINGS OF BLOOD CHEMISTRY: ICD-10-CM

## 2024-07-09 DIAGNOSIS — N39.41 URGE INCONTINENCE: ICD-10-CM

## 2024-07-09 DIAGNOSIS — N40.1 BENIGN PROSTATIC HYPERPLASIA WITH LOWER URINARY TRACT SYMPMS: ICD-10-CM

## 2024-07-09 DIAGNOSIS — R73.09 OTHER ABNORMAL GLUCOSE: ICD-10-CM

## 2024-07-09 PROCEDURE — 99443: CPT | Mod: 93

## 2024-07-09 RX ORDER — SILODOSIN 4 MG/1
4 CAPSULE ORAL DAILY
Qty: 30 | Refills: 11 | Status: ACTIVE | COMMUNITY
Start: 2024-07-09 | End: 1900-01-01

## 2025-01-03 ENCOUNTER — APPOINTMENT (OUTPATIENT)
Dept: ORTHOPEDIC SURGERY | Facility: CLINIC | Age: 70
End: 2025-01-03
Payer: MEDICARE

## 2025-01-03 DIAGNOSIS — M77.02 MEDIAL EPICONDYLITIS, LEFT ELBOW: ICD-10-CM

## 2025-01-03 DIAGNOSIS — M77.01 MEDIAL EPICONDYLITIS, RIGHT ELBOW: ICD-10-CM

## 2025-01-03 PROCEDURE — 99203 OFFICE O/P NEW LOW 30 MIN: CPT

## 2025-01-08 ENCOUNTER — NON-APPOINTMENT (OUTPATIENT)
Age: 70
End: 2025-01-08

## 2025-01-08 ENCOUNTER — APPOINTMENT (OUTPATIENT)
Dept: CARDIOLOGY | Facility: CLINIC | Age: 70
End: 2025-01-08

## 2025-01-08 VITALS
HEART RATE: 57 BPM | DIASTOLIC BLOOD PRESSURE: 79 MMHG | BODY MASS INDEX: 23.54 KG/M2 | OXYGEN SATURATION: 96 % | HEIGHT: 67 IN | WEIGHT: 150 LBS | SYSTOLIC BLOOD PRESSURE: 137 MMHG

## 2025-01-08 PROCEDURE — 99214 OFFICE O/P EST MOD 30 MIN: CPT | Mod: 25

## 2025-01-08 PROCEDURE — 93000 ELECTROCARDIOGRAM COMPLETE: CPT

## 2025-07-09 ENCOUNTER — APPOINTMENT (OUTPATIENT)
Dept: CARDIOLOGY | Facility: CLINIC | Age: 70
End: 2025-07-09

## 2025-07-09 VITALS
OXYGEN SATURATION: 97 % | HEART RATE: 63 BPM | SYSTOLIC BLOOD PRESSURE: 157 MMHG | BODY MASS INDEX: 23.49 KG/M2 | WEIGHT: 150 LBS | DIASTOLIC BLOOD PRESSURE: 82 MMHG

## 2025-07-09 PROCEDURE — 93000 ELECTROCARDIOGRAM COMPLETE: CPT

## 2025-07-09 PROCEDURE — 99214 OFFICE O/P EST MOD 30 MIN: CPT | Mod: 25

## 2025-07-28 ENCOUNTER — APPOINTMENT (OUTPATIENT)
Dept: UROLOGY | Facility: CLINIC | Age: 70
End: 2025-07-28
Payer: MEDICARE

## 2025-07-28 DIAGNOSIS — N13.8 BENIGN PROSTATIC HYPERPLASIA WITH LOWER URINARY TRACT SYMPMS: ICD-10-CM

## 2025-07-28 DIAGNOSIS — N40.1 BENIGN PROSTATIC HYPERPLASIA WITH LOWER URINARY TRACT SYMPMS: ICD-10-CM

## 2025-07-28 DIAGNOSIS — N39.41 URGE INCONTINENCE: ICD-10-CM

## 2025-07-28 DIAGNOSIS — R79.89 OTHER SPECIFIED ABNORMAL FINDINGS OF BLOOD CHEMISTRY: ICD-10-CM

## 2025-07-28 DIAGNOSIS — R73.09 OTHER ABNORMAL GLUCOSE: ICD-10-CM

## 2025-07-28 PROCEDURE — 99215 OFFICE O/P EST HI 40 MIN: CPT | Mod: 25,2W

## 2025-07-28 PROCEDURE — G2212 PROLONG OUTPT/OFFICE VIS: CPT | Mod: 2W
